# Patient Record
Sex: FEMALE | Race: WHITE | Employment: FULL TIME | URBAN - METROPOLITAN AREA
[De-identification: names, ages, dates, MRNs, and addresses within clinical notes are randomized per-mention and may not be internally consistent; named-entity substitution may affect disease eponyms.]

---

## 2017-01-19 LAB
ANTIBODY SCREEN, EXTERNAL: NORMAL
CHLAMYDIA, EXTERNAL: NORMAL
HBSAG, EXTERNAL: NORMAL
HCT, EXTERNAL: 38.4
HGB, EXTERNAL: 12.7
HIV, EXTERNAL: NORMAL
N. GONORRHEA, EXTERNAL: NORMAL
PLATELET CNT,   EXTERNAL: 325
RPR, EXTERNAL: NORMAL
RUBELLA, EXTERNAL: NORMAL
TYPE, ABO & RH, EXTERNAL: NORMAL
URINALYSIS, EXTERNAL: NORMAL

## 2017-07-24 LAB — GRBS, EXTERNAL: POSITIVE

## 2017-07-26 ENCOUNTER — HOSPITAL ENCOUNTER (EMERGENCY)
Age: 22
Discharge: HOME | End: 2017-07-26
Attending: OBSTETRICS & GYNECOLOGY | Admitting: SPECIALIST
Payer: OTHER GOVERNMENT

## 2017-07-26 VITALS
SYSTOLIC BLOOD PRESSURE: 128 MMHG | HEART RATE: 107 BPM | WEIGHT: 169 LBS | DIASTOLIC BLOOD PRESSURE: 79 MMHG | TEMPERATURE: 98.1 F

## 2017-07-26 PROCEDURE — 99283 EMERGENCY DEPT VISIT LOW MDM: CPT

## 2017-07-26 PROCEDURE — 59025 FETAL NON-STRESS TEST: CPT

## 2017-07-26 PROCEDURE — 96372 THER/PROPH/DIAG INJ SC/IM: CPT

## 2017-07-26 PROCEDURE — 74011250636 HC RX REV CODE- 250/636: Performed by: OBSTETRICS & GYNECOLOGY

## 2017-07-26 RX ORDER — BETAMETHASONE SODIUM PHOSPHATE AND BETAMETHASONE ACETATE 3; 3 MG/ML; MG/ML
12 INJECTION, SUSPENSION INTRA-ARTICULAR; INTRALESIONAL; INTRAMUSCULAR; SOFT TISSUE ONCE
Status: COMPLETED | OUTPATIENT
Start: 2017-07-26 | End: 2017-07-26

## 2017-07-26 RX ADMIN — BETAMETHASONE SODIUM PHOSPHATE AND BETAMETHASONE ACETATE 12 MG: 3; 3 INJECTION, SUSPENSION INTRA-ARTICULAR; INTRALESIONAL; INTRAMUSCULAR at 15:08

## 2017-07-26 NOTE — IP AVS SNAPSHOT
Summary of Care Report The Summary of Care report has been created to help improve care coordination. Users with access to [employer] or [address] (Web-based application) may access additional patient information including the Discharge Summary. If you are not currently a [address] user and need more information, please call the number listed below in the [place] [address] section and ask to be connected with Medical Records. Facility Information Name Address Phone [address] [facility] [address] [place] [id number] [phone] Patient Information Patient Name Sex HERBERT Richardson  ([id number]) Female 1995 Discharge Information Admitting Provider Service Area Unit Ariana Rodgers MD / [address] Labor & Delivery / [phone] Discharge Provider Discharge Date/Time Discharge Disposition Destination (none) 2017 (Pending) AHR (none) Patient Language Language ENGLISH [13] You are allergic to the following No active allergies Current Discharge Medication List  
  
Notice You have not been prescribed any medications. Follow-up Information Follow up With Details Comments Contact Info None   None (395) Patient stated that they have no PCP [facility] In 1 day Follow up for shot, bring with you to office for 3:15pm 2017 [address] [facility] [address] 
[phone] Discharge Instructions None Chart Review Routing History No Routing History on File

## 2017-07-26 NOTE — IP AVS SNAPSHOT
[address] Patient: Candice Alvarez MRN: [id number] :1995 Current Discharge Medication List  
  
Notice You have not been prescribed any medications.

## 2017-07-26 NOTE — PROGRESS NOTES
Maranda Maravilla MD  [address]   [address] [facility]  [address]  [phone]     Labor and delivery screening visit    Name: Merceda Primrose MRN: [id number]  SSN: [id number]    YOB: 1995  Age: 23 y.o. Sex: female        Theron Lugo is a 23 y.o.  female who is due 2017, by Other Basis. This makes her 36w0d. She  is being seen for   Chief Complaint   Patient presents with    Headache   ,   OB History    Para Term  AB Living   1        SAB TAB Ectopic Molar Multiple Live Births              # Outcome Date GA Lbr Xavier/2nd Weight Sex Delivery Anes PTL Lv   1 Current                   She is being seen in screening location 3420/01. [address] [facility]    Historical Additional  Problem List.:    No Known Allergies  No past medical history on file. Past Surgical History:   Procedure Laterality Date    HX OTHER SURGICAL      wisdom teeth removed     No family history on file. Social History     Social History    Marital status:      Spouse name: N/A    Number of children: N/A    Years of education: N/A     Occupational History    Not on file. Social History Main Topics    Smoking status: Never Smoker    Smokeless tobacco: Never Used    Alcohol use No    Drug use: No    Sexual activity: Yes     Partners: Male     Other Topics Concern    Not on file     Social History Narrative    No narrative on file     Prior to Admission medications    Not on File        Objective:  Visit Vitals    /69    Pulse (!) 106    Temp 98.1 °F (36.7 °C)    Wt 76.7 kg (169 lb)           No results found for this or any previous visit (from the past 24 hour(s)). Fetal Heart Rate:   Baseline FHR: No data found.        Fetal heart variability:moderate  Fetal Heart Rate decelerations: none  Fetal Heart Rate accelerations: yes  At least 10 beats elevation and two or more in twenty minutes  Baseline FHR:between 120-160beats per minute  Fetal Non-stress Test: Reactive  Assessment of NST:  Reactive and  No evidence of fetal comprimise         Estimated Date of Delivery: 17  OB History      Para Term  AB Living    1         SAB TAB Ectopic Molar Multiple Live Births                       Physical Exam: Per myself or nursing assessment:  Patient without distress  HEENT: No obvious head trauma, she can hear at a conversational level, vision is adequate, and no choking or difficulty swallowing. Neurologically: She oriented to time and place as well as understands her situation and give a good medical history. Chest: clear to auscultation and percussion  Rflexes 2 +  Abdomen: benign with no obvious gallbladder or appendicits type pain, no CVA tenderness and the fundal size is consistent with her dates within four centimeters. Pelvic: External genitalia normal without inflammation, lesions or abnormal discharge  Extremities: with some swelling but not hyperreflexia  CervicalExam: / / / closed by report             Impression/Plan:     Plan:  Excellent BP here, labs not repeated  Steroid dose given and can repeat in office tomorrow as we are holding out and RBA made clear. Margene Sandifer given to PT and will bring with her tomorrow to office, good NST now. Procedures done: screening visit of moderate complexity.                                      Non-stress test, 00171-25                                  Signed By:  Suzie Valverde MD     2017

## 2017-07-26 NOTE — IP AVS SNAPSHOT
Mk Santoro 
 
 
 [address] [place] [address] [address] Patient: Lennox Gibbons MRN: [id number] :1995 You are allergic to the following No active allergies Recent Documentation Weight OB Status Smoking Status 76.7 kg Pregnant Never Smoker Emergency Contacts Name Discharge Info Relation Home Work Mobile Tex Dowell DISCHARGE CAREGIVER [3] Spouse [3]   [phone] About your hospitalization You were admitted on:  N/A You last received care in the:  [address] [place] [address] You were discharged on:  2017 Unit phone number:  [phone] Why you were hospitalized Your primary diagnosis was:  Not on File Providers Seen During Your Hospitalizations Provider Role Specialty Primary office phone Ad Alcantar MD Attending Provider Obstetrics & Gynecology [phone] Your Primary Care Physician (PCP) Primary Care Physician Office Phone Office Fax NONE ** None ** ** None ** Follow-up Information Follow up With Details Comments Contact Info None   None (395) Patient stated that they have no PCP [facility] In 1 day Follow up for shot, bring with you to office for 3:15pm 2017 [address] [facility] [address] 
[phone] Current Discharge Medication List  
  
Notice You have not been prescribed any medications. Discharge Instructions None Discharge Instructions Attachments/References PREGNANCY: HIGH BLOOD PRESSURE (ENGLISH) PREECLAMPSIA (ENGLISH) Discharge Orders None Introducing [facility]! Raphael Mart introduces [employer] patient portal. Now you can access parts of your medical record, email your doctor's office, and request medication refills online.    
 
1. In your internet browser, go to [email or web address] 2. Click on the First Time User? Click Here link in the Sign In box. You will see the New Member Sign Up page. 3. Enter your [employer] Access Code exactly as it appears below. You will not need to use this code after youve completed the sign-up process. If you do not sign up before the expiration date, you must request a new code. · [employer] Access Code: [email or web address] Expires: 10/24/2017  3:43 PM 
 
4. Enter the last four digits of your Social Security Number (xxxx) and Date of Birth (mm/dd/yyyy) as indicated and click Submit. You will be taken to the next sign-up page. 5. Create a [employer] ID. This will be your [employer] login ID and cannot be changed, so think of one that is secure and easy to remember. 6. Create a [employer] password. You can change your password at any time. 7. Enter your Password Reset Question and Answer. This can be used at a later time if you forget your password. 8. Enter your e-mail address. You will receive e-mail notification when new information is available in [address] 9. Click Sign Up. You can now view and download portions of your medical record. 10. Click the Download Summary menu link to download a portable copy of your medical information. If you have questions, please visit the Frequently Asked Questions section of the [employer] website. Remember, [employer] is NOT to be used for urgent needs. For medical emergencies, dial 911. Now available from your iPhone and Android! General Information Please provide this summary of care documentation to your next provider. Patient Signature:  ____________________________________________________________ Date:  ____________________________________________________________  
  
Gearldine Moulds Provider Signature:  ____________________________________________________________ Date:  ____________________________________________________________ More Information High Blood Pressure in Pregnancy: Care Instructions Your Care Instructions High blood pressure (hypertension) means that the force of blood against your artery walls is too strong. Mild high blood pressure during pregnancy is not usually dangerous. Your doctor will probably just want to watch you closely. But when blood pressure is very high, it can reduce oxygen to your baby. This can affect how well your baby grows. High blood pressure also means that you are at higher risk for: · Preeclampsia. This is a problem that includes high blood pressure and damage to your liver or kidneys. It can also reduce how much oxygen your baby gets. In some cases, it leads to eclampsia. Eclampsia causes seizures. · Placental abruption. This is a problem when the placenta separates from the uterus before birth. It prevents the baby from getting enough oxygen and nutrients. Sometimes it can cause death for the baby and the mother. To prevent problems for you or your baby, you will have to check your blood pressure often. You will do this until after your baby is born. If your blood pressure rises suddenly or is very high during your pregnancy, your doctor may prescribe medicines. They can usually control blood pressure. If your blood pressure affects your or your baby's health, your doctor may need to deliver your baby early. After your baby is born, your blood pressure will probably improve. But sometimes blood pressure problems continue after birth. Follow-up care is a key part of your treatment and safety. Be sure to make and go to all appointments, and call your doctor if you are having problems. It's also a good idea to know your test results and keep a list of the medicines you take. How can you care for yourself at home? · Take and write down your blood pressure at home if your doctor tells you to. · Take your medicines exactly as prescribed.  Call your doctor if you think you are having a problem with your medicine. · Do not smoke. If you need help quitting, talk to your doctor about stop-smoking programs and medicines. These can increase your chances of quitting for good. · Do not gain too much weight during your pregnancy. Talk to your doctor about how much weight gain is healthy. · Eat a healthy diet. · Don't use a lot of salt. Take the salt shaker off the table. · Get regular, mild exercise. Walking or swimming several times a week can be healthy for you and your baby. · Reduce stress, and find time to relax. This is very important if you continue to work or have a busy schedule. It's also important if you have small children at home. When should you call for help? Call 911 anytime you think you may need emergency care. For example, call if: 
· You passed out (lost consciousness). · You have a seizure. Call your doctor now or seek immediate medical care if: 
· You have symptoms of preeclampsia, such as: 
¨ Sudden swelling of your face, hands, or feet. ¨ New vision problems (such as dimness or blurring). ¨ A severe headache. · Your blood pressure is higher than it should be or rises suddenly. · You have new nausea or vomiting. · You think that you are in labor. · You have new belly pain. Watch closely for changes in your health, and be sure to contact your doctor if: 
· You gain weight rapidly. Where can you learn more? Go to http://fred-sommer.info/. Enter [phone] in the search box to learn more about \"High Blood Pressure in Pregnancy: Care Instructions. \" Current as of: March 16, 2017 Content Version: 11.3 © 6123-7713 Healthwise, Incorporated. Care instructions adapted under license by [employer] (which disclaims liability or warranty for this information).  If you have questions about a medical condition or this instruction, always ask your healthcare professional. Charli Aggarwal, [facility] disclaims any warranty or liability for your use of this information. Preeclampsia: Care Instructions Your Care Instructions Preeclampsia occurs when a woman's blood pressure rises during pregnancy. Often with preeclampsia, you also have swelling in your legs, hands, and face. A test may show too much protein in your urine. Preeclampsia is also called toxemia. If preeclampsia is severe and not treated, it can lead to seizures (eclampsia) and damage to your liver or kidneys. Preeclampsia can prevent your baby from getting enough food and oxygen. This can cause a low birth weight or other problems. Your doctor will watch you closely to prevent these problems. He or she also may recommend that you rest in bed most of the day. If your preeclampsia is a danger to your health or the health of your baby, your doctor may need to deliver your baby early. While preeclampsia is a concern, most women with preeclampsia have healthy babies. After a woman gives birth, preeclampsia usually goes away on its own. Follow-up care is a key part of your treatment and safety. Be sure to make and go to all appointments, and call your doctor if you are having problems. It's also a good idea to know your test results and keep a list of the medicines you take. How can you care for yourself at home? · Take and record your blood pressure at home if your doctor tells you to. ¨ Learn the importance of the two measures of blood pressure (such as 120 over 80, or 120/80). The first number is the systolic pressure, which is the force of blood on the artery walls as the heart pumps. The second number is the diastolic pressure, which is the force of blood on the artery walls between heartbeats, when the heart is at rest. You have a choice of monitors to use. ¨ Manual monitor: You pump up the cuff and use a stethoscope to listen for your pulse.  
¨ Electronic monitor: The cuff inflates, and a gauge shows your pulse rate. 
¨ To take your blood pressure: ¨ Ask your doctor to check your blood pressure monitor to be sure that it is accurate and that the cuff fits you. Also ask your doctor to watch you to make sure that you are using it right. ¨ You should not eat, use tobacco products, or use medicine known to raise blood pressure (such as some nasal decongestant sprays) before you take your blood pressure. ¨ Avoid taking your blood pressure if you have just exercised or are nervous or upset. Rest at least 15 minutes before you take your blood pressure. · If your doctor advises, check the protein levels in your urine. Your doctor or nurse will show you how to do this. · Take your medicines exactly as prescribed. Call your doctor if you think you are having a problem with your medicine. · Do not smoke. Quitting smoking will help lower your blood pressure and improve your baby's growth and health. If you need help quitting, talk to your doctor about stop-smoking programs and medicines. These can increase your chances of quitting for good. · Eat a balanced and healthy diet that has lots of fruits and vegetables. · If your doctor advised bed rest, be sure to stay off your feet and rest as much as possible. ¨ Keep a phone, phone book, notepad, and pen near the bed where you can easily reach them. ¨ Gently stretch your legs every hour to maintain good blood flow. ¨ Have another family member pack snacks and lunch food in a cooler close to your bed. ¨ Use this time for activities that you usually cannot find time for, such as reading, craft projects, or letter writing. · You can keep track of your baby's health by noting the length of time it takes to count 10 movements (such as kicks, flutters, or rolls). Feeling 10 movements in less than 1 hour is considered normal. Track your baby's movements once each day, and bring this record with you to each prenatal visit. When should you call for help? Call 911 anytime you think you may need emergency care. For example, call if: 
· You have a seizure. · You passed out (lost consciousness). · You have severe vaginal bleeding. · You have severe pain in your belly or pelvis. · You have had fluid gushing or leaking from your vagina and you know or think the umbilical cord is bulging into your vagina. If this happens, immediately get down on your knees so your rear end (buttocks) is higher than your head. This will decrease the pressure on the cord until help arrives. Call your doctor now or seek immediate medical care if: 
· You have sudden swelling of your face, hands, or feet. · You have new vision problems (such as dimness or blurring). · You have a severe headache. · You have any vaginal bleeding. · You have belly pain or cramping. · You have a fever. · You have had regular contractions (with or without pain) for an hour. This means that you have 8 or more within 1 hour or 4 or more in 20 minutes after you change your position and drink fluids. · You have a sudden release of fluid from the vagina. · You have low back pain or pelvic pressure that does not go away. · You notice that your baby has stopped moving or is moving much less than normal. 
Watch closely for changes in your health, and be sure to contact your doctor if you have any questions. Where can you learn more? Go to http://fred-sommer.info/. Enter A537 in the search box to learn more about \"Preeclampsia: Care Instructions. \" Current as of: March 16, 2017 Content Version: 11.3 © 2319-9189 [employer]. Care instructions adapted under license by [employer] (which disclaims liability or warranty for this information). If you have questions about a medical condition or this instruction, always ask your healthcare professional. [address] any warranty or liability for your use of this information.

## 2017-07-26 NOTE — ROUTINE PROCESS
Pt arrived to unit from [place] office with 3 day history of Headaches and High BP, denies dizziness, blurred vision. Monitors applied and BP obtained. 12 Dr Blanco Shadow in with pt discussing [address] Pt discharged home with instructions and medication to take to Dr office.

## 2017-07-28 ENCOUNTER — HOSPITAL ENCOUNTER (OUTPATIENT)
Age: 22
Setting detail: OBSERVATION
Discharge: HOME | End: 2017-07-29
Attending: SPECIALIST | Admitting: OBSTETRICS & GYNECOLOGY
Payer: OTHER GOVERNMENT

## 2017-07-28 PROBLEM — O12.13 PROTEINURIA AFFECTING PREGNANCY IN THIRD TRIMESTER: Status: ACTIVE | Noted: 2017-07-28

## 2017-07-28 PROBLEM — R51.9 EPISODIC HEADACHE: Status: ACTIVE | Noted: 2017-07-28

## 2017-07-28 LAB
ALBUMIN SERPL BCP-MCNC: 2.7 G/DL (ref 3.4–5)
ALBUMIN/GLOB SERPL: 0.8 {RATIO} (ref 0.8–1.7)
ALP SERPL-CCNC: 105 U/L (ref 45–117)
ALT SERPL-CCNC: 12 U/L (ref 13–56)
ANION GAP BLD CALC-SCNC: 14 MMOL/L (ref 3–18)
AST SERPL W P-5'-P-CCNC: 11 U/L (ref 15–37)
BASOPHILS # BLD AUTO: 0 K/UL (ref 0–0.06)
BASOPHILS # BLD: 0 % (ref 0–2)
BILIRUB SERPL-MCNC: 0.2 MG/DL (ref 0.2–1)
BUN SERPL-MCNC: 14 MG/DL (ref 7–18)
BUN/CREAT SERPL: 22 (ref 12–20)
CALCIUM SERPL-MCNC: 9 MG/DL (ref 8.5–10.1)
CHLORIDE SERPL-SCNC: 108 MMOL/L (ref 100–108)
CO2 SERPL-SCNC: 19 MMOL/L (ref 21–32)
CREAT SERPL-MCNC: 0.65 MG/DL (ref 0.6–1.3)
DIFFERENTIAL METHOD BLD: ABNORMAL
EOSINOPHIL # BLD: 0 K/UL (ref 0–0.4)
EOSINOPHIL NFR BLD: 0 % (ref 0–5)
ERYTHROCYTE [DISTWIDTH] IN BLOOD BY AUTOMATED COUNT: 14 % (ref 11.6–14.5)
GLOBULIN SER CALC-MCNC: 3.3 G/DL (ref 2–4)
GLUCOSE SERPL-MCNC: 86 MG/DL (ref 74–99)
HCT VFR BLD AUTO: 28.8 % (ref 35–45)
HGB BLD-MCNC: 9.3 G/DL (ref 12–16)
LYMPHOCYTES # BLD AUTO: 8 % (ref 21–52)
LYMPHOCYTES # BLD: 1.2 K/UL (ref 0.9–3.6)
MCH RBC QN AUTO: 29.2 PG (ref 24–34)
MCHC RBC AUTO-ENTMCNC: 32.3 G/DL (ref 31–37)
MCV RBC AUTO: 90.6 FL (ref 74–97)
MONOCYTES # BLD: 1.4 K/UL (ref 0.05–1.2)
MONOCYTES NFR BLD AUTO: 10 % (ref 3–10)
NEUTS SEG # BLD: 12.3 K/UL (ref 1.8–8)
NEUTS SEG NFR BLD AUTO: 82 % (ref 40–73)
PLATELET # BLD AUTO: 197 K/UL (ref 135–420)
PMV BLD AUTO: 10 FL (ref 9.2–11.8)
POTASSIUM SERPL-SCNC: 4 MMOL/L (ref 3.5–5.5)
PROT SERPL-MCNC: 6 G/DL (ref 6.4–8.2)
RBC # BLD AUTO: 3.18 M/UL (ref 4.2–5.3)
SODIUM SERPL-SCNC: 141 MMOL/L (ref 136–145)
URATE SERPL-MCNC: 6.1 MG/DL (ref 2.6–7.2)
WBC # BLD AUTO: 14.9 K/UL (ref 4.6–13.2)

## 2017-07-28 PROCEDURE — 84156 ASSAY OF PROTEIN URINE: CPT | Performed by: OBSTETRICS & GYNECOLOGY

## 2017-07-28 PROCEDURE — 80053 COMPREHEN METABOLIC PANEL: CPT | Performed by: OBSTETRICS & GYNECOLOGY

## 2017-07-28 PROCEDURE — 85025 COMPLETE CBC W/AUTO DIFF WBC: CPT | Performed by: OBSTETRICS & GYNECOLOGY

## 2017-07-28 PROCEDURE — 99218 HC RM OBSERVATION: CPT

## 2017-07-28 PROCEDURE — 82570 ASSAY OF URINE CREATININE: CPT | Performed by: SPECIALIST

## 2017-07-28 PROCEDURE — 36415 COLL VENOUS BLD VENIPUNCTURE: CPT | Performed by: OBSTETRICS & GYNECOLOGY

## 2017-07-28 PROCEDURE — 77030020255 HC SOL INJ LR 1000ML BG

## 2017-07-28 PROCEDURE — 83615 LACTATE (LD) (LDH) ENZYME: CPT | Performed by: OBSTETRICS & GYNECOLOGY

## 2017-07-28 PROCEDURE — 74011250637 HC RX REV CODE- 250/637: Performed by: OBSTETRICS & GYNECOLOGY

## 2017-07-28 PROCEDURE — 84550 ASSAY OF BLOOD/URIC ACID: CPT | Performed by: OBSTETRICS & GYNECOLOGY

## 2017-07-28 RX ORDER — ACETAMINOPHEN 500 MG
1000 TABLET ORAL ONCE
Status: COMPLETED | OUTPATIENT
Start: 2017-07-28 | End: 2017-07-28

## 2017-07-28 RX ORDER — SODIUM CHLORIDE 0.9 % (FLUSH) 0.9 %
5-10 SYRINGE (ML) INJECTION EVERY 8 HOURS
Status: DISCONTINUED | OUTPATIENT
Start: 2017-07-28 | End: 2017-07-29 | Stop reason: HOSPADM

## 2017-07-28 RX ORDER — SODIUM CHLORIDE 0.9 % (FLUSH) 0.9 %
5-10 SYRINGE (ML) INJECTION AS NEEDED
Status: DISCONTINUED | OUTPATIENT
Start: 2017-07-28 | End: 2017-07-29 | Stop reason: HOSPADM

## 2017-07-28 RX ADMIN — ACETAMINOPHEN 1000 MG: 500 TABLET ORAL at 21:18

## 2017-07-28 NOTE — IP AVS SNAPSHOT
[address] Patient: Catrachita Del Cid MRN: [id number] :1995 You are allergic to the following No active allergies Recent Documentation OB Status Smoking Status Pregnant Never Smoker Unresulted Labs Order Current Status LD In process Emergency Contacts Name Discharge Info Relation Home Work Mobile Tex Dowell DISCHARGE CAREGIVER [3] Spouse [3]   [phone] About your hospitalization You were admitted on:  2017 You last received care in the:  [address] [place] [address] You were discharged on:  2017 Unit phone number:  [phone] Why you were hospitalized Your primary diagnosis was:  Episodic Headache Your diagnoses also included:  Proteinuria Affecting Pregnancy In Third Trimester Providers Seen During Your Hospitalizations Provider Role Specialty Primary office phone Farhad Ivey MD Attending Provider Obstetrics & Gynecology [phone] Your Primary Care Physician (PCP) Primary Care Physician Office Phone Office Fax NONE ** None ** ** None ** Follow-up Information None Current Discharge Medication List  
  
Notice You have not been prescribed any medications. Discharge Instructions None Discharge Instructions Attachments/References PREGNANCY: PRECAUTIONS (ENGLISH) PREECLAMPSIA (ENGLISH) Discharge Orders None Introducing [facility]! [employer] introduces PPTV patient portal. Now you can access parts of your medical record, email your doctor's office, and request medication refills online. 1. In your internet browser, go to [email or web address] 2. Click on the First Time User? Click Here link in the Sign In box. You will see the New Member Sign Up page. 3. Enter your ClaimSync Access Code exactly as it appears below. You will not need to use this code after youve completed the sign-up process. If you do not sign up before the expiration date, you must request a new code. · ClaimSync Access Code: [email or web address] Expires: 10/24/2017  3:43 PM 
 
4. Enter the last four digits of your Social Security Number (xxxx) and Date of Birth (mm/dd/yyyy) as indicated and click Submit. You will be taken to the next sign-up page. 5. Create a ClaimSync ID. This will be your ClaimSync login ID and cannot be changed, so think of one that is secure and easy to remember. 6. Create a ClaimSync password. You can change your password at any time. 7. Enter your Password Reset Question and Answer. This can be used at a later time if you forget your password. 8. Enter your e-mail address. You will receive e-mail notification when new information is available in [address] 9. Click Sign Up. You can now view and download portions of your medical record. 10. Click the Download Summary menu link to download a portable copy of your medical information. If you have questions, please visit the Frequently Asked Questions section of the ClaimSync website. Remember, ClaimSync is NOT to be used for urgent needs. For medical emergencies, dial 911. Now available from your iPhone and Android! General Information Please provide this summary of care documentation to your next provider. Patient Signature:  ____________________________________________________________ Date:  ____________________________________________________________  
  
Daniel Cart Provider Signature:  ____________________________________________________________ Date:  ____________________________________________________________ More Information Pregnancy Precautions: Care Instructions Your Care Instructions There is no sure way to prevent labor before your due date ( labor) or to prevent most other pregnancy problems. But there are things you can do to increase your chances of a healthy pregnancy. Go to your appointments, follow your doctor's advice, and take good care of yourself. Eat well, and exercise (if your doctor agrees). And make sure to drink plenty of water. Follow-up care is a key part of your treatment and safety. Be sure to make and go to all appointments, and call your doctor if you are having problems. It's also a good idea to know your test results and keep a list of the medicines you take. How can you care for yourself at home? · Make sure you go to your prenatal appointments. At each visit, your doctor will check your blood pressure. Your doctor will also check to see if you have protein in your urine. High blood pressure and protein in urine are signs of preeclampsia. This condition can be dangerous for you and your baby. · Drink plenty of fluids, enough so that your urine is light yellow or clear like water. Dehydration can cause contractions. If you have kidney, heart, or liver disease and have to limit fluids, talk with your doctor before you increase the amount of fluids you drink. · Tell your doctor right away if you notice any symptoms of an infection, such as: ¨ Burning when you urinate. ¨ A foul-smelling discharge from your vagina. ¨ Vaginal itching. ¨ Unexplained fever. ¨ Unusual pain or soreness in your uterus or lower belly. · Eat a balanced diet. Include plenty of foods that are high in calcium and iron. ¨ Foods high in calcium include milk, cheese, yogurt, almonds, and broccoli. ¨ Foods high in iron include red meat, shellfish, poultry, eggs, beans, raisins, whole-grain bread, and leafy green vegetables. · Do not smoke. If you need help quitting, talk to your doctor about stop-smoking programs and medicines. These can increase your chances of quitting for good. · Do not drink alcohol or use illegal drugs. · Follow your doctor's directions about activity. Your doctor will let you know how much, if any, exercise you can do. · Ask your doctor if you can have sex. If you are at risk for early labor, your doctor may ask you to not have sex. · Take care to prevent falls. During pregnancy, your joints are loose, and your balance is off. Sports such as bicycling, skiing, or in-line skating can increase your risk of falling. And don't ride horses or motorcycles, dive, water ski, scuba dive, or parachute jump while you are pregnant. · Avoid getting very hot. Do not use saunas or hot tubs. Avoid staying out in the sun in hot weather for long periods. Take acetaminophen (Tylenol) to lower a high fever. · Do not take any over-the-counter or herbal medicines or supplements without talking to your doctor or pharmacist first. 
When should you call for help? Call 911 anytime you think you may need emergency care. For example, call if: 
· You passed out (lost consciousness). · You have severe vaginal bleeding. · You have severe pain in your belly or pelvis. · You have had fluid gushing or leaking from your vagina and you know or think the umbilical cord is bulging into your vagina. If this happens, immediately get down on your knees so your rear end (buttocks) is higher than your head. This will decrease the pressure on the cord until help arrives. Call your doctor now or seek immediate medical care if: 
· You have signs of preeclampsia, such as: 
¨ Sudden swelling of your face, hands, or feet. ¨ New vision problems (such as dimness or blurring). ¨ A severe headache. · You have any vaginal bleeding. · You have belly pain or cramping. · You have a fever. · You have had regular contractions (with or without pain) for an hour. This means that you have 8 or more within 1 hour or 4 or more in 20 minutes after you change your position and drink fluids. · You have a sudden release of fluid from your vagina. · You have low back pain or pelvic pressure that does not go away. · You notice that your baby has stopped moving or is moving much less than normal. 
Watch closely for changes in your health, and be sure to contact your doctor if you have any problems. Where can you learn more? Go to http://fred-sommer.info/. Enter [id number] in the search box to learn more about \"Pregnancy Precautions: Care Instructions. \" Current as of: March 16, 2017 Content Version: 11.3 © 8414-1824 [employer]. Care instructions adapted under license by [employer] (which disclaims liability or warranty for this information). If you have questions about a medical condition or this instruction, always ask your healthcare professional. [address] any warranty or liability for your use of this information. Preeclampsia: Care Instructions Your Care Instructions Preeclampsia occurs when a woman's blood pressure rises during pregnancy. Often with preeclampsia, you also have swelling in your legs, hands, and face. A test may show too much protein in your urine. Preeclampsia is also called toxemia. If preeclampsia is severe and not treated, it can lead to seizures (eclampsia) and damage to your liver or kidneys. Preeclampsia can prevent your baby from getting enough food and oxygen. This can cause a low birth weight or other problems. Your doctor will watch you closely to prevent these problems. He or she also may recommend that you rest in bed most of the day. If your preeclampsia is a danger to your health or the health of your baby, your doctor may need to deliver your baby early. While preeclampsia is a concern, most women with preeclampsia have healthy babies. After a woman gives birth, preeclampsia usually goes away on its own. Follow-up care is a key part of your treatment and safety.  Be sure to make and go to all appointments, and call your doctor if you are having problems. It's also a good idea to know your test results and keep a list of the medicines you take. How can you care for yourself at home? · Take and record your blood pressure at home if your doctor tells you to. ¨ Learn the importance of the two measures of blood pressure (such as 120 over 80, or 120/80). The first number is the systolic pressure, which is the force of blood on the artery walls as the heart pumps. The second number is the diastolic pressure, which is the force of blood on the artery walls between heartbeats, when the heart is at rest. You have a choice of monitors to use. ¨ Manual monitor: You pump up the cuff and use a stethoscope to listen for your pulse. ¨ Electronic monitor: The cuff inflates, and a gauge shows your pulse rate. ¨ To take your blood pressure: ¨ Ask your doctor to check your blood pressure monitor to be sure that it is accurate and that the cuff fits you. Also ask your doctor to watch you to make sure that you are using it right. ¨ You should not eat, use tobacco products, or use medicine known to raise blood pressure (such as some nasal decongestant sprays) before you take your blood pressure. ¨ Avoid taking your blood pressure if you have just exercised or are nervous or upset. Rest at least 15 minutes before you take your blood pressure. · If your doctor advises, check the protein levels in your urine. Your doctor or nurse will show you how to do this. · Take your medicines exactly as prescribed. Call your doctor if you think you are having a problem with your medicine. · Do not smoke. Quitting smoking will help lower your blood pressure and improve your baby's growth and health. If you need help quitting, talk to your doctor about stop-smoking programs and medicines. These can increase your chances of quitting for good. · Eat a balanced and healthy diet that has lots of fruits and vegetables. · If your doctor advised bed rest, be sure to stay off your feet and rest as much as possible. ¨ Keep a phone, phone book, notepad, and pen near the bed where you can easily reach them. ¨ Gently stretch your legs every hour to maintain good blood flow. ¨ Have another family member pack snacks and lunch food in a cooler close to your bed. ¨ Use this time for activities that you usually cannot find time for, such as reading, craft projects, or letter writing. · You can keep track of your baby's health by noting the length of time it takes to count 10 movements (such as kicks, flutters, or rolls). Feeling 10 movements in less than 1 hour is considered normal. Track your baby's movements once each day, and bring this record with you to each prenatal visit. When should you call for help? Call 911 anytime you think you may need emergency care. For example, call if: 
· You have a seizure. · You passed out (lost consciousness). · You have severe vaginal bleeding. · You have severe pain in your belly or pelvis. · You have had fluid gushing or leaking from your vagina and you know or think the umbilical cord is bulging into your vagina. If this happens, immediately get down on your knees so your rear end (buttocks) is higher than your head. This will decrease the pressure on the cord until help arrives. Call your doctor now or seek immediate medical care if: 
· You have sudden swelling of your face, hands, or feet. · You have new vision problems (such as dimness or blurring). · You have a severe headache. · You have any vaginal bleeding. · You have belly pain or cramping. · You have a fever. · You have had regular contractions (with or without pain) for an hour. This means that you have 8 or more within 1 hour or 4 or more in 20 minutes after you change your position and drink fluids. · You have a sudden release of fluid from the vagina. · You have low back pain or pelvic pressure that does not go away. · You notice that your baby has stopped moving or is moving much less than normal. 
Watch closely for changes in your health, and be sure to contact your doctor if you have any questions. Where can you learn more? Go to http://fred-sommer.info/. Enter L435 in the search box to learn more about \"Preeclampsia: Care Instructions. \" Current as of: March 16, 2017 Content Version: 11.3 © 6338-8064 [employer]. Care instructions adapted under license by [employer] (which disclaims liability or warranty for this information). If you have questions about a medical condition or this instruction, always ask your healthcare professional. [address] any warranty or liability for your use of this information.

## 2017-07-28 NOTE — PROGRESS NOTES
Dr Ry Herman called and notified of pt c/o headache on rt side of head. No visual disturbances but eyes hurt, no epigastric pain. orders for [address] work up

## 2017-07-28 NOTE — PROGRESS NOTES
24 yo cf ambulating in from dr alvarado office for monitoring of elevated pb.  36 2/7 weeks. Dr Allyssa Ruvalcaba is covering    Pt changed into hosp gown spot urine, monitors applied. call bell and phone in reach. Pt states has a h/a rt side of head near temple to back of head 6/10, pt taking fiorcet that works x 1 hr. Pt states her eyes feel strained but no visual disturbancpt has some uc-\"rigid\" at times, denies epigastric pain.  Denies srom or vag bleeding

## 2017-07-28 NOTE — IP AVS SNAPSHOT
Summary of Care Report The Summary of Care report has been created to help improve care coordination. Users with access to [employer] or [address] (Web-based application) may access additional patient information including the Discharge Summary. If you are not currently a [address] user and need more information, please call the number listed below in the [place] [address] section and ask to be connected with Medical Records. Facility Information Name Address Phone [facility] [address] [facility] [address] [id number] [phone] Patient Information Patient Name Sex  Kwaku Escamilla ([id number]) Female 1995 Discharge Information Admitting Provider Service Area Unit Barbarann Cheadle, MD / Mihaela John 92 3 Labor & Delivery / [phone] Discharge Provider Discharge Date/Time Discharge Disposition Destination (none) (none) (none) (none) Patient Language Language ENGLISH [13] Hospital Problems as of 2017  Reviewed: 2017  9:12 PM by Barbarann Cheadle, MD  
  
  
  
 Class Noted - Resolved Last Modified POA Active Problems * (Principal)Episodic headache  2017 - Present 2017 by Barbarann Cheadle, MD Unknown Entered by Barbarann Cheadle, MD  
  Proteinuria affecting pregnancy in third trimester  2017 - Present 2017 by Barbarann Cheadle, MD Unknown Entered by Barbarann Cheadle, MD  
  
Non-Hospital Problems as of 2017  Reviewed: 2017  9:12 PM by Barbarann Cheadle, MD  
 None You are allergic to the following No active allergies Current Discharge Medication List  
  
Notice You have not been prescribed any medications. Follow-up Information None Discharge Instructions None Chart Review Routing History No Routing History on File

## 2017-07-29 VITALS
OXYGEN SATURATION: 98 % | TEMPERATURE: 98.5 F | HEART RATE: 83 BPM | SYSTOLIC BLOOD PRESSURE: 110 MMHG | DIASTOLIC BLOOD PRESSURE: 61 MMHG

## 2017-07-29 PROBLEM — R51.9 EPISODIC HEADACHE: Status: RESOLVED | Noted: 2017-07-28 | Resolved: 2017-07-29

## 2017-07-29 LAB — LDH SERPL L TO P-CCNC: 146 U/L (ref 81–234)

## 2017-07-29 PROCEDURE — 99218 HC RM OBSERVATION: CPT

## 2017-07-29 PROCEDURE — 96365 THER/PROPH/DIAG IV INF INIT: CPT

## 2017-07-29 PROCEDURE — 59025 FETAL NON-STRESS TEST: CPT

## 2017-07-29 PROCEDURE — 96360 HYDRATION IV INFUSION INIT: CPT

## 2017-07-29 PROCEDURE — 99284 EMERGENCY DEPT VISIT MOD MDM: CPT

## 2017-07-29 NOTE — DISCHARGE INSTRUCTIONS
Continue collecting urine until 11:00 tonight. Keep on ice. Return urine container to hospital Sunday morning.

## 2017-07-29 NOTE — PROGRESS NOTES
Antepartum progress note    Patient seen, fetal heart rate and contraction pattern evaluated, patient examined. HA improved with use of Tylenol, no visual changes, no SOB, no CP, no RUQ pain, + FM    Patient Vitals for the past 8 hrs:   Temp Pulse BP   07/29/17 0900 - 83 110/61   07/29/17 0801 - 84 98/49   07/29/17 0728 98.5 °F (36.9 °C) 90 131/80   07/29/17 0700 - 95 104/55   07/29/17 0600 - 88 116/60   07/29/17 0500 98.3 °F (36.8 °C) 94 134/70   07/29/17 0400 - 91 110/55   07/29/17 0300 - 95 101/49         Recent Results (from the past 24 hour(s))   PROTEIN/CREATININE RATIO, URINE    Collection Time: 07/28/17  5:30 PM   Result Value Ref Range    Protein, urine random 24 (H) <11.9 mg/dL    Creatinine, urine 62.00 30 - 125 mg/dL    Protein/Creat. urine Ratio 0.4     URIC ACID    Collection Time: 07/28/17  6:00 PM   Result Value Ref Range    Uric acid 6.1 2.6 - 7.2 MG/DL   METABOLIC PANEL, COMPREHENSIVE    Collection Time: 07/28/17  6:00 PM   Result Value Ref Range    Sodium 141 136 - 145 mmol/L    Potassium 4.0 3.5 - 5.5 mmol/L    Chloride 108 100 - 108 mmol/L    CO2 19 (L) 21 - 32 mmol/L    Anion gap 14 3.0 - 18 mmol/L    Glucose 86 74 - 99 mg/dL    BUN 14 7.0 - 18 MG/DL    Creatinine 0.65 0.6 - 1.3 MG/DL    BUN/Creatinine ratio 22 (H) 12 - 20      GFR est AA >60 >60 ml/min/1.73m2    GFR est non-AA >60 >60 ml/min/1.73m2    Calcium 9.0 8.5 - 10.1 MG/DL    Bilirubin, total 0.2 0.2 - 1.0 MG/DL    ALT (SGPT) 12 (L) 13 - 56 U/L    AST (SGOT) 11 (L) 15 - 37 U/L    Alk.  phosphatase 105 45 - 117 U/L    Protein, total 6.0 (L) 6.4 - 8.2 g/dL    Albumin 2.7 (L) 3.4 - 5.0 g/dL    Globulin 3.3 2.0 - 4.0 g/dL    A-G Ratio 0.8 0.8 - 1.7     CBC WITH AUTOMATED DIFF    Collection Time: 07/28/17  6:00 PM   Result Value Ref Range    WBC 14.9 (H) 4.6 - 13.2 K/uL    RBC 3.18 (L) 4.20 - 5.30 M/uL    HGB 9.3 (L) 12.0 - 16.0 g/dL    HCT 28.8 (L) 35.0 - 45.0 %    MCV 90.6 74.0 - 97.0 FL    MCH 29.2 24.0 - 34.0 PG    MCHC 32.3 31.0 - 37.0 g/dL    RDW 14.0 11.6 - 14.5 %    PLATELET 835 427 - 662 K/uL    MPV 10.0 9.2 - 11.8 FL    NEUTROPHILS 82 (H) 40 - 73 %    LYMPHOCYTES 8 (L) 21 - 52 %    MONOCYTES 10 3 - 10 %    EOSINOPHILS 0 0 - 5 %    BASOPHILS 0 0 - 2 %    ABS. NEUTROPHILS 12.3 (H) 1.8 - 8.0 K/UL    ABS. LYMPHOCYTES 1.2 0.9 - 3.6 K/UL    ABS. MONOCYTES 1.4 (H) 0.05 - 1.2 K/UL    ABS. EOSINOPHILS 0.0 0.0 - 0.4 K/UL    ABS. BASOPHILS 0.0 0.0 - 0.06 K/UL    DF AUTOMATED         Physical Exam:  Alert, oriented x3  CTA unruly  H: RRR  Abdomen: gravidic, soft , non tender   Ext : no e/c/c DTR's +2/IV  Tracing : category 1, no contractions  Fetal Heart Rate: 140\"s reactive    Assessment/Plan:  Patient Active Problem List   Diagnosis Code    Episodic headache R51    Proteinuria affecting pregnancy in third trimester O12.13   Reassuring fetal status. Normal BP's  Will d/c home, she will bring urine for testing tomorrow morning.  She has appointment Monday morning and will forward results to Dr France Saenz MD

## 2017-07-29 NOTE — DISCHARGE SUMMARY
Gynecology Discharge Note          Name: Placido Enamorado   Medical Record Number: [id number]   YOB: 1995  Today's Date: 2017  . Admit date: 2017    Discharge Date: 2017    Attending Physician: Leah Granda MD    Admission Diagnoses: Proteinuria affecting pregnancy in third trimester    Discharge Diagnoses:     Problem List as of 2017  Date Reviewed: 2017          Codes Class Noted - Resolved    Proteinuria affecting pregnancy in third trimester ICD-10-CM: O12.13  ICD-9-CM: 646.23, 791.0  2017 - Present        * (Principal)RESOLVED: Episodic headache ICD-10-CM: R51  ICD-9-CM: 784.0  2017 - 2017                Vitals:    Patient Vitals for the past 8 hrs:   BP Temp Pulse   17 0900 110/61 - 83   17 0801 98/49 - 84   17 0728 131/80 98.5 °F (36.9 °C) 90   17 0700 104/55 - 95   17 0600 116/60 - 88   17 0500 134/70 98.3 °F (36.8 °C) 94   17 0400 110/55 - 91   17 0300 101/49 - 95     Temp (24hrs), Av.3 °F (36.8 °C), Min:98 °F (36.7 °C), Max:98.5 °F (36.9 °C)        Lab/Data Review:      WBC   Date/Time Value Ref Range Status   2017 06:00 PM 14.9 (H) 4.6 - 13.2 K/uL Final   2017 01:00 PM 9.4 4.0 - 11.0 1000/mm3 Final     HGB   Date/Time Value Ref Range Status   2017 06:00 PM 9.3 (L) 12.0 - 16.0 g/dL Final   2017 01:00 PM 12.7 (L) 13.0 - 17.2 gm/dl Final     PLATELET   Date/Time Value Ref Range Status   2017 06:00  135 - 420 K/uL Final               Assessment and Plan:  HA improved, Normal BP, mild proteinuria    Patient Instructions:Continue routine post-op care. With discharge home.  Follow-up Appointments   Procedures    FOLLOW UP VISIT Appointment in: 3 - 5 Days     Standing Status:   Standing     Number of Occurrences:   1     Order Specific Question:   Appointment in     Answer:   3 - 5 Days        Signed By:  Fracisco Blanchard MD     2017

## 2017-07-29 NOTE — ROUTINE PROCESS
Bedside shift change report given to mimi benson rn (oncoming nurse) by alba olmstead rn (offgoing nurse). Report included the following information SBAR, Kardex and MAR.

## 2017-07-29 NOTE — ROUTINE PROCESS
1915 Assumed care of patient, received report from [facility] Олег Kirkpatrick RN.  2040 Spoke with Dr. Otilio Choe MD notified as follows; urine results pending, decel noted, patient was sitting up in bed at time of decel, FHR returned to baseline with accel, patient continues to c/o headache, order given for Tylenol 1000mg PO.

## 2017-07-29 NOTE — H&P
Obstetric Admission History and Physical    Name: Wendy Hernández MRN: [id number] SSN: [id number]    YOB: 1995  Age: 23 y.o. Sex: female       Subjective:      Chief complaint:    Chief Complaint   Patient presents with    Hypertension       Verito Morfin is a 23 y.o.  female, G 1 P 0 who presents at 36 weeks 2d gestation with HA. On admission EFM strip is obtained and is Category 1. Sent from Dr Shell Boo to r/out Pre eclampsia. PAST OB HISTORY  OB History      Para Term  AB Living    1         SAB TAB Ectopic Molar Multiple Live Births                   PAST MEDICAL HISTORY  No past medical history on file. PAST SURGICAL HISTORY  Past Surgical History:   Procedure Laterality Date    HX OTHER SURGICAL      wisdom teeth removed       SOCIAL HISTORY  Social History     Social History    Marital status:      Spouse name: N/A    Number of children: N/A    Years of education: N/A     Occupational History    Not on file. Social History Main Topics    Smoking status: Never Smoker    Smokeless tobacco: Never Used    Alcohol use No    Drug use: No    Sexual activity: Yes     Partners: Male     Other Topics Concern    Not on file     Social History Narrative    No narrative on file       FAMILY HISTORY  No family history on file. ALLERGY:  No Known Allergies    HOME MEDICATIONS:  Prior to Admission medications    Not on File        Review of Systems:  A comprehensive review of systems was negative except for: R sided HA    Objective:     VITAL SIGNS:  Visit Vitals    /60    Pulse 89    Temp 98 °F (36.7 °C)    SpO2 94%     BP wnl   Spot urine 387  One episode of spontaneous decel with recovery     Assessment:     1) 36 weeks Intrauterine Pregnancy .   2) observation  3) Tylenol prn      Plan:     Reassuring fetal status   Observe overnight  Start 24h urine     Signed By:  Albert Lamas MD     2017                      Signed By:  Gilford Crews MD Dhruv     July 28, 2017

## 2017-07-29 NOTE — ROUTINE PROCESS
0715 Bedside and Verbal shift change report given to Aliza Morrow RN (oncoming nurse) by Le Horta RN (offgoing nurse). Report included the following information SBAR, Kardex, Intake/Output, MAR and Recent Results.

## 2017-07-30 LAB
COLLECT DURATION TIME UR: 24 HR
COLLECT DURATION TIME UR: 24 HR
CREAT 24H UR-MRATE: 1451 MG/24HR
PROT 24H UR-MRATE: 352 MG/24HR
SPECIMEN VOL ?TM UR: 1600 ML
SPECIMEN VOL ?TM UR: 1600 ML

## 2017-08-09 ENCOUNTER — HOSPITAL ENCOUNTER (INPATIENT)
Age: 22
LOS: 3 days | Discharge: HOME | End: 2017-08-12
Attending: SPECIALIST | Admitting: SPECIALIST
Payer: OTHER GOVERNMENT

## 2017-08-09 PROBLEM — O14.00 MILD PREECLAMPSIA: Status: ACTIVE | Noted: 2017-08-09

## 2017-08-09 LAB
BASOPHILS # BLD AUTO: 0 K/UL (ref 0–0.06)
BASOPHILS # BLD: 0 % (ref 0–2)
DIFFERENTIAL METHOD BLD: ABNORMAL
EOSINOPHIL # BLD: 0.1 K/UL (ref 0–0.4)
EOSINOPHIL NFR BLD: 1 % (ref 0–5)
ERYTHROCYTE [DISTWIDTH] IN BLOOD BY AUTOMATED COUNT: 14.7 % (ref 11.6–14.5)
HCT VFR BLD AUTO: 34.6 % (ref 35–45)
HGB BLD-MCNC: 10.9 G/DL (ref 12–16)
LYMPHOCYTES # BLD AUTO: 16 % (ref 21–52)
LYMPHOCYTES # BLD: 1.7 K/UL (ref 0.9–3.6)
MCH RBC QN AUTO: 28.4 PG (ref 24–34)
MCHC RBC AUTO-ENTMCNC: 31.5 G/DL (ref 31–37)
MCV RBC AUTO: 90.1 FL (ref 74–97)
MONOCYTES # BLD: 1 K/UL (ref 0.05–1.2)
MONOCYTES NFR BLD AUTO: 10 % (ref 3–10)
NEUTS SEG # BLD: 7.6 K/UL (ref 1.8–8)
NEUTS SEG NFR BLD AUTO: 73 % (ref 40–73)
PLATELET # BLD AUTO: 182 K/UL (ref 135–420)
PMV BLD AUTO: 10.1 FL (ref 9.2–11.8)
RBC # BLD AUTO: 3.84 M/UL (ref 4.2–5.3)
WBC # BLD AUTO: 10.4 K/UL (ref 4.6–13.2)

## 2017-08-09 PROCEDURE — 4A0H7CZ MEASUREMENT OF PRODUCTS OF CONCEPTION, CARDIAC RATE, VIA NATURAL OR ARTIFICIAL OPENING: ICD-10-PCS | Performed by: OBSTETRICS & GYNECOLOGY

## 2017-08-09 PROCEDURE — 74011250636 HC RX REV CODE- 250/636: Performed by: SPECIALIST

## 2017-08-09 PROCEDURE — 74011000258 HC RX REV CODE- 258: Performed by: SPECIALIST

## 2017-08-09 PROCEDURE — 86900 BLOOD TYPING SEROLOGIC ABO: CPT | Performed by: SPECIALIST

## 2017-08-09 PROCEDURE — 85025 COMPLETE CBC W/AUTO DIFF WBC: CPT | Performed by: SPECIALIST

## 2017-08-09 PROCEDURE — 77030020255 HC SOL INJ LR 1000ML BG

## 2017-08-09 PROCEDURE — 86920 COMPATIBILITY TEST SPIN: CPT | Performed by: SPECIALIST

## 2017-08-09 PROCEDURE — 65270000029 HC RM PRIVATE

## 2017-08-09 PROCEDURE — 3E033VJ INTRODUCTION OF OTHER HORMONE INTO PERIPHERAL VEIN, PERCUTANEOUS APPROACH: ICD-10-PCS | Performed by: OBSTETRICS & GYNECOLOGY

## 2017-08-09 PROCEDURE — 10907ZC DRAINAGE OF AMNIOTIC FLUID, THERAPEUTIC FROM PRODUCTS OF CONCEPTION, VIA NATURAL OR ARTIFICIAL OPENING: ICD-10-PCS | Performed by: OBSTETRICS & GYNECOLOGY

## 2017-08-09 RX ORDER — MINERAL OIL
30 OIL (ML) ORAL AS NEEDED
Status: DISCONTINUED | OUTPATIENT
Start: 2017-08-09 | End: 2017-08-09 | Stop reason: HOSPADM

## 2017-08-09 RX ORDER — MISOPROSTOL 200 UG/1
800 TABLET ORAL
Status: DISCONTINUED | OUTPATIENT
Start: 2017-08-09 | End: 2017-08-10 | Stop reason: SDUPTHER

## 2017-08-09 RX ORDER — NALBUPHINE HYDROCHLORIDE 10 MG/ML
10 INJECTION, SOLUTION INTRAMUSCULAR; INTRAVENOUS; SUBCUTANEOUS
Status: DISCONTINUED | OUTPATIENT
Start: 2017-08-09 | End: 2017-08-09 | Stop reason: HOSPADM

## 2017-08-09 RX ORDER — OXYTOCIN/RINGER'S LACTATE 20/1000 ML
500 PLASTIC BAG, INJECTION (ML) INTRAVENOUS ONCE
Status: DISCONTINUED | OUTPATIENT
Start: 2017-08-09 | End: 2017-08-09 | Stop reason: HOSPADM

## 2017-08-09 RX ORDER — LIDOCAINE HYDROCHLORIDE 10 MG/ML
30 INJECTION, SOLUTION EPIDURAL; INFILTRATION; INTRACAUDAL; PERINEURAL AS NEEDED
Status: DISCONTINUED | OUTPATIENT
Start: 2017-08-09 | End: 2017-08-09 | Stop reason: HOSPADM

## 2017-08-09 RX ORDER — LANOLIN ALCOHOL/MO/W.PET/CERES
325 CREAM (GRAM) TOPICAL DAILY
COMMUNITY

## 2017-08-09 RX ORDER — HYDROMORPHONE HYDROCHLORIDE 1 MG/ML
1 INJECTION, SOLUTION INTRAMUSCULAR; INTRAVENOUS; SUBCUTANEOUS
Status: DISCONTINUED | OUTPATIENT
Start: 2017-08-09 | End: 2017-08-09 | Stop reason: HOSPADM

## 2017-08-09 RX ORDER — TERBUTALINE SULFATE 1 MG/ML
0.25 INJECTION SUBCUTANEOUS
Status: DISCONTINUED | OUTPATIENT
Start: 2017-08-09 | End: 2017-08-09 | Stop reason: HOSPADM

## 2017-08-09 RX ORDER — MAG HYDROX/ALUMINUM HYD/SIMETH 200-200-20
15 SUSPENSION, ORAL (FINAL DOSE FORM) ORAL
Status: DISCONTINUED | OUTPATIENT
Start: 2017-08-09 | End: 2017-08-09 | Stop reason: HOSPADM

## 2017-08-09 RX ORDER — OXYTOCIN IN 5 % DEXTROSE 30/500 ML
1-25 PLASTIC BAG, INJECTION (ML) INTRAVENOUS
Status: DISCONTINUED | OUTPATIENT
Start: 2017-08-09 | End: 2017-08-12 | Stop reason: HOSPADM

## 2017-08-09 RX ORDER — SODIUM CHLORIDE, SODIUM LACTATE, POTASSIUM CHLORIDE, CALCIUM CHLORIDE 600; 310; 30; 20 MG/100ML; MG/100ML; MG/100ML; MG/100ML
125 INJECTION, SOLUTION INTRAVENOUS CONTINUOUS
Status: DISCONTINUED | OUTPATIENT
Start: 2017-08-09 | End: 2017-08-09 | Stop reason: HOSPADM

## 2017-08-09 RX ORDER — METHYLERGONOVINE MALEATE 0.2 MG/ML
0.2 INJECTION INTRAVENOUS AS NEEDED
Status: DISCONTINUED | OUTPATIENT
Start: 2017-08-09 | End: 2017-08-10 | Stop reason: SDUPTHER

## 2017-08-09 RX ORDER — OXYTOCIN/RINGER'S LACTATE 20/1000 ML
125 PLASTIC BAG, INJECTION (ML) INTRAVENOUS CONTINUOUS
Status: DISCONTINUED | OUTPATIENT
Start: 2017-08-09 | End: 2017-08-09 | Stop reason: HOSPADM

## 2017-08-09 RX ORDER — ACETAMINOPHEN 325 MG/1
650 TABLET ORAL
Status: DISCONTINUED | OUTPATIENT
Start: 2017-08-09 | End: 2017-08-09 | Stop reason: HOSPADM

## 2017-08-09 RX ORDER — OXYTOCIN 10 [USP'U]/ML
10 INJECTION, SOLUTION INTRAMUSCULAR; INTRAVENOUS
Status: DISCONTINUED | OUTPATIENT
Start: 2017-08-09 | End: 2017-08-09 | Stop reason: HOSPADM

## 2017-08-09 RX ORDER — ONDANSETRON 2 MG/ML
4 INJECTION INTRAMUSCULAR; INTRAVENOUS
Status: DISCONTINUED | OUTPATIENT
Start: 2017-08-09 | End: 2017-08-09 | Stop reason: HOSPADM

## 2017-08-09 RX ADMIN — SODIUM CHLORIDE, SODIUM LACTATE, POTASSIUM CHLORIDE, AND CALCIUM CHLORIDE 125 ML/HR: 600; 310; 30; 20 INJECTION, SOLUTION INTRAVENOUS at 14:30

## 2017-08-09 RX ADMIN — Medication 2 MILLI-UNITS/MIN: at 15:32

## 2017-08-09 RX ADMIN — PENICILLIN G POTASSIUM 2.5 MILLION UNITS: 20000000 POWDER, FOR SOLUTION INTRAVENOUS at 19:58

## 2017-08-09 RX ADMIN — SODIUM CHLORIDE 5 MILLION UNITS: 900 INJECTION INTRAVENOUS at 15:17

## 2017-08-09 NOTE — PROGRESS NOTES
~~ 1330 REC  @N 38 0/7 WKS HERE FOR IND FOR PIH & PROTEIN IN URINE-- CURRENTLY PT DENIES HA, EPIGASTRIC PAIN, VISION CHANGES OR SUDDEN/SEVERE SWELLING. PT DENIES CTX, SROM OR VAG BLEEDING. +FM PER PT. PT INTO GOWN & BED-- EFM APPLIED, ABD PALP SOFT. FHT 140S. PT TO L TILT, SR UP X2, CB IN REACH, FOB IN- REFLEXES WNL BILATERALLY, 1 BEAT OF CLONUS BILATERALLY. POC REVIEWED    ~~ 1341 CONSENTS REVIEWED & LEFT FOR PT TO SIGN    ~~ 1415 ALL CONSENTS SIGNED W/O ? S    ~~ 1430 IV STARTED & ADMIT BLOOD WORK DRAWN- PT VERY ANXIOUS ABOUT IV START SAYING \"IT TOOK THEM 3 TRIES LAST TIME. \"    DEEP BREATHING & RELAXATION TECHNIQUES REVIEWED & ENCOURAGED. PT WELL CONTROLLED. IV STARTED IN 1 ATTEMPT.    ~~1445 DR Moiz Romano PAGEPARTHA, MADE AWARE PT HERE-- MD TO PUT IN ADMIT ORDERS & +GBS ORDERS. DATA BASES COMPLETE-- HX INCLUDES NKDA, +GBS, INCREASED WT GAIN-     ~~ 1517 PCN 5 MILLION UNITS UP IVPB FOR +GBS-- REVIEWED S/E- PT TOLD TO CALL W/ ANY DOSCOMF    ~~1532 2 VISITORS ARE HERE- PITOCIN STARTED @ 2MU VIA PUMP AFTER REVIEW OF MED, INCREASE SCHEDULE & EXPECTED RESULT- PT VOICES UNDERSTANDING    ~~1540 DR Moiz Romano IN [place] TO SEE PT & REVIEW POC    ~~1615 PIT INCREASED- PT STARTING TO FEEL SOME CTX- FOB & VISITORS OUT TO GET CLEAR LIQUIDS OK'd BY MD    ~~1640 FOB & VISITORS RETURN    ~~1700 PT GETTING MORE UNCOMF & STARTING TO BREATH W/ CTX-- SUGGESTED VISITORS (ON PHONES & NOT INVITED TO BE HERE BY PT)  EXIT--  THEY GO PLEASANTLY & PT VOCES APPRECIATION-- REVIEWED BREATHING & RELAXATION TECHNIQUES W/ PT--  PT HAD NO CBE. PT UP TO BR TO VOID QS & STRETCH [place] IN THE ROOM. FOB ASSISTING. PIT INCREASED--    ~~1705 PT BACK TO BED, SITTING.  FHT TRACING WELL-     ~~ 1732 TOCO REPOS/ADJ TO BETTER TRACE CTX-- PT MADE AWARE SHE NOW HAS 5 VISITORS IN THE WAITING ROOM-- PT RELUCTANTLY OKs THEM TO VISIT-- REVIEWED 4 MAX @ ANY 1 TIME-- PT UNDERSTANDS    ~~ 1738 4 VISITORS IN    ~~1805 PT BREATHING W/ CTX, COPING WELL    ~~1839 DR Moiz Romano IN TO SEE & ASSESS PT-  VISITORS OUT, FOB REMAINS- VE DONE--  EXAM DIFF SO LEGS BROUGHT BACK--  MD UNABLE TO AROM DUE TO UNENGAGED PRES PART. PT GUERDA WELL-- MD DISCUSSING POC OPTIONS INCLUDING SERIAL IND OVER DAYS-- PT & FOB RECEPTIVE TO INFO    ~~1850 TIME SPENT SITTING @ BS TO TALK TO PT & FOB & BE SURE THEY BOTH UNDERSTAND ALL THAT HAS BEEN REVIEWED-- ALL ? S ANSWERED    ~~1905 REPORT TO RELIEF

## 2017-08-09 NOTE — PROGRESS NOTES
LABOR PROGRESS NOTE   2017  7:55 PM   Patient's Name:  Kalina Parkinson MRN: [id number]. : 1995    ASSESSMENT   Elier's Estimated Gestational Age: 38w0d weeks. Induced for mild pre-eclampsia. Hypertension with proteinuria. Exam at this time reveals cephalic presentation too high to safely AROM. Discussed with patient and  possibility of multiday induction. Fortunately no stigmata of severe PIH and current BP levels averaging lower than in hospital.  Continue Pitocin at this time in hopes of fostering descent to a point AROM is feasible. .  CERVICAL EXAM: (data input under \"more activities\" and \"flowsheets\" at the top.)  Cervical Exam  Dilation (cm): 2  Eff: 50 %  Station: Floating  Position: Posterior  Cervical Consistency: Soft  Vaginal exam done by? : dr Corey Joshi Status: Intact    FETAL MONITORING:    Baseline FHR: Patient Vitals for the past 2 hrs: Mode Fetal Heart Rate Variability Decelerations Accelerations   17 1900 External 130 6-25 BPM - Yes   17 1840 External 140 6-25 BPM None Yes   17 1820 External 145 6-25 BPM Variable Yes   17 1800 External 130 6-25 BPM None Yes       Elier's  level of comfort is: good. Elier's progress was discussed with her. PLAN    Continue current course with watchful waiting in anticipation of progress.   Continue plan for vaginal delivery  OBJECTIVE DATA / PHYSICAL EXAM     Visit Vitals    /83    Pulse 91    Temp 98.5 °F (36.9 °C)    Resp 18    Ht 5' 1\" (1.549 m)    Wt 78 kg (172 lb)    BMI 32.5 kg/m2       WBC   Date/Time Value Ref Range Status   2017 02:30 PM 10.4 4.6 - 13.2 K/uL Final   2017 06:00 PM 14.9 (H) 4.6 - 13.2 K/uL Final   2017 01:00 PM 9.4 4.0 - 11.0 1000/mm3 Final     HGB   Date/Time Value Ref Range Status   2017 02:30 PM 10.9 (L) 12.0 - 16.0 g/dL Final   2017 06:00 PM 9.3 (L) 12.0 - 16.0 g/dL Final   2017 01:00 PM 12.7 (L) 13.0 - 17.2 gm/dl Final     PLATELET   Date/Time Value Ref Range Status   08/09/2017 02:30  135 - 420 K/uL Final     Hgb, External   Date/Time Value Ref Range Status   01/19/2017 12.7  Final     Platelet cnt., External   Date/Time Value Ref Range Status   01/19/2017 325  Final     AUTHOR:  Gavin Monae MD  August 9, 2017 7:55 PM

## 2017-08-09 NOTE — H&P
HOSPITAL ENCOUNTER NOTE    Patient's Name:  Jessica Reddy. MRN: [id number]. : 1995. Date of Service:  2017  3:14 PM  Physician:  Praveen Saul MD    Management Preeclampsia in Pregnancy    IDENTIFYING DATA  Jessica Reddy is a  23 y.o.   pregnant patient who has developed mild hypertension associated with proteinuria. No persistent Headaches or visual symptoms. Estimated Date of Delivery: 17  Her Estimated Gestational Age is 38w0d weeks. IMPRESSION: Dx Preeclampsia New HTN, Prot or end organ dysfn. [x] Blood pressure over 140/90 x2 4hrs apart after 20 wks. (new onset)  [] Proteinuria > 300 mg /24hr urine or   [x] Urine Protein/Creatinine ratio > 0.3 or urine dip >=  1+ with or without Sxs  [] New onset HTN without proteinuria Dx preeclampsia if:      Ptl < 100,000. Serum Cr > 1.1,  SGOT/PT 2x nl. Pulm edema, Cerebral visual Sx. Severe preeclampsia (Rx proven to reduce stroke)    []. .. Blood pressure greater than 160/110 on 2 occasions 4 hours apart    []. .. Oliguria less than 500 mL per 24 hours    []. .. Progressive renal insufficiency    []. .. Unremitting headache or visual disturbances    []. .. Pulmonary edema    []. .. Epigastric or right upper quadrant pain    []. .. Liver functions 2 times normal    []. ... Platelets less than [phone],[phone]      RECOMMENDATIONS:  For severe hypertension, Headaches or Seizures. Labetalol  (20, 40, 80, 80, 80 mg IV over 2 minutes, escalating doses, repeat every 10 minutes) Normodyne / trandate or 200 mg orally if no IV access. Avoid in asthma, heart failure. Hydralazine 5-10 mg IV over 2 min, repeat every 20 min. Until target blood pressure. achieved. Magnesium sulfate 4-6 g in 100 mL IV  over  20 minutes. Followed by IV infusion of 1-2 g per hour - continue for 24 hours postpartum seizure treatment or prophylaxis only. Contraindications: Pulmonary edema, renal failure, myasthenia gravis. If no IV access:   . .... Rupali Martin Labetalol 200 mg orally or nifedipine 10 mg orally. Repeat in 30 min. If persistent severe hypertension and IV access still unavailable  . ...... MgSO4 10 grams 50% solution IM, 5 grams in each buttox. Vital Signs   . Michaelyn Daily ..q 10 min. Until controlled plus one-hour. .... q 15 min. Next hour   . Michaelyn Daily ..q 30 min Following hour and then   . Michaelyn Daily ..q hour 4 hours  Labs: CBC platelets, LDH, LFTs, Lytes, BUN, creatinine, urine protein. Magnesium sulfate contraindicated  . .. Michaelyn Daily Michaelyn Daily Lorazepam (Ativan)  2-4 mg IV one dose, may repeat once after 10-15 min. .... Michaelyn Daily Diazepam 5-10 mg IV every 5-10 min. To a maximum dose of 30 mg  Magnesium toxicity  . Michaelyn Daily ... Stop magnesium maintenance  . Michaelyn Daily ... Calcium gluconate 1 g IV over 1 to 2 min. (10 mL of 10% solution)       Review of Systems (consider brain imaging studies)  None of the symptoms below are present:  Unremitting headaches  Visual changes  Abnormal neurologic examination  Lethargy  Confusion  Seizures  Focal signs and symptoms  Uncontrolled high blood pressure  Epigastric pain  Vaginal bleeding  Decreased Fetal activity    REVIEW OF SYMPTOMS:   Constitutional: fever, chills denied. Head, Ears, Nose Throat:   Ear pain denied. Sore throat denied. Eyes: Pain denied. Visual disturbance denied. Respiratory: Cough denied. Shortness of breath denied. Cardiovascular: Chest pains denied. Gastrointestinal:   Nausea denied. Vomiting denied. Diarrhea denied. Constipation denied. Genitourinary:   Vaginal Bleeding denied. Vaginal Odor denied. Painful Urination denied. Blood in Urine denied. Pain over kidneys denied. Musculoskeletal: Back pain denied, Joint pain denied. Skin:  Rash denied. Injuries denied. Neurological:    Headaches denied. Dizziness denied. Seizures denied. Psychiatric/Behavioral:   Major mood problems denied.       Objective:     Physical Exam:   General appearance:   Alert, oriented to person, place, and time, cooperative, no distress, appears stated age and is well-developed and well-nourished. Head, Nose, Throat: Normocephalic, atraumatic. Eyes: Conjunctivae appear normal.  Pupils are equal and round. Neck: Normal range of motion. Supple. Heart:  Regular rate and Rhythm. Lungs: Effort normal, No apparent respiratory distress, Wheezes or Cough. Abdomen: Abnormal tenderness not detected. Scars None detected. Size appropriate for 38 weeks pregnancy. External genitalia: normal appearance without obvious lesions. [place]tholin's,  [place]'s, Urethra: Normal.   Vaginal Vault:  Discharge or Odor not detected. Blood not detected. Foreign body or Injury not detected. Uterus:  Appropriate size for gestational age. Cervix 2-3 cm. Musculoskeletal: Normal range of motion. Neurological: Loss of strength or sensation not detected. Disorientation to time, person, place not detected. Skin: Lesions not detected. Rashes not detected. Extremities:  Trauma not detected. Swelling or edema not detected. Psychiatric: Abnormal mood or affect not detected.

## 2017-08-09 NOTE — IP AVS SNAPSHOT
[address] Patient: Sole Hidalgo MRN: [id number] :1995 Current Discharge Medication List  
  
ASK your doctor about these medications Dose & Instructions Dispensing Information Comments Morning Noon Evening Bedtime Iron 325 mg (65 mg iron) tablet Generic drug:  ferrous sulfate Your last dose was: Your next dose is:    
   
   
 Dose:  325 mg Take 325 mg by mouth daily. Refills:  0 PRENATA PO Your last dose was: Your next dose is:    
   
   
 Dose:  1 Tab Take 1 Tab by mouth daily. Refills:  0

## 2017-08-09 NOTE — IP AVS SNAPSHOT
Gio Killian 
 
 
 [address] [place] [address] Patient: Sole Hidalgo MRN: [id number] :1995 You are allergic to the following No active allergies Recent Documentation Height Weight Breastfeeding? BMI OB Status Smoking Status 1.549 m 78 kg Unknown 32.5 kg/m2 Recent pregnancy Never Smoker Unresulted Labs Order Current Status TYPE & SCREEN Preliminary result Emergency Contacts Name Discharge Info Relation Home Work Mobile Tex Dowell DISCHARGE CAREGIVER [3] Spouse [3]   [phone] About your hospitalization You were admitted on:  2017 You last received care in the:  [place] [address] You were discharged on:  2017 Unit phone number:  [phone] Why you were hospitalized Your primary diagnosis was:  Mild Preeclampsia Your diagnoses also included:  Labor And Delivery, Indication For Care Providers Seen During Your Hospitalizations Provider Role Specialty Primary office phone Nabila Meneses MD Attending Provider Obstetrics & Gynecology [phone] Your Primary Care Physician (PCP) Primary Care Physician Office Phone Office Fax NONE ** None ** ** None ** Follow-up Information Follow up With Details Comments Contact Info Nabila Meneses MD Schedule an appointment as soon as possible for a visit in 6 weeks iron infusion monday [address]  [address] [place] [address] 
[phone] Current Discharge Medication List  
  
ASK your doctor about these medications Dose & Instructions Dispensing Information Comments Morning Noon Evening Bedtime Iron 325 mg (65 mg iron) tablet Generic drug:  ferrous sulfate Your last dose was: Your next dose is:    
   
   
 Dose:  325 mg Take 325 mg by mouth daily. Refills:  0  PRENATA PO  
   
 Your last dose was: Your next dose is:    
   
   
 Dose:  1 Tab Take 1 Tab by mouth daily. Refills:  0 Discharge Instructions None Discharge Instructions Attachments/References DEPRESSION: POSTPARTUM (ENGLISH) BOTTLE-FEEDING (ENGLISH) POSTPARTUM (ENGLISH) Discharge Orders None Introducing [facility] SERVICES! [employer] introduces [employer] patient portal. Now you can access parts of your medical record, email your doctor's office, and request medication refills online. 1. In your internet browser, go to [email or web address] 2. Click on the First Time User? Click Here link in the Sign In box. You will see the New Member Sign Up page. 3. Enter your Marblar Access Code exactly as it appears below. You will not need to use this code after youve completed the sign-up process. If you do not sign up before the expiration date, you must request a new code. · Marblar Access Code: [email or web address] Expires: 10/24/2017  3:43 PM 
 
4. Enter the last four digits of your Social Security Number (xxxx) and Date of Birth (mm/dd/yyyy) as indicated and click Submit. You will be taken to the next sign-up page. 5. Create a Marblar ID. This will be your Marblar login ID and cannot be changed, so think of one that is secure and easy to remember. 6. Create a Marblar password. You can change your password at any time. 7. Enter your Password Reset Question and Answer. This can be used at a later time if you forget your password. 8. Enter your e-mail address. You will receive e-mail notification when new information is available in [address]. 9. Click Sign Up. You can now view and download portions of your medical record. 10. Click the Download Summary menu link to download a portable copy of your medical information.  
 
If you have questions, please visit the Frequently Asked Questions section of "[employer]. Remember, [employer] is NOT to be used for urgent needs. For medical emergencies, dial 911. Now available from your iPhone and Android! General Information Please provide this summary of care documentation to your next provider. Patient Signature:  ____________________________________________________________ Date:  ____________________________________________________________  
  
Elaine Nascimento Provider Signature:  ____________________________________________________________ Date:  ____________________________________________________________ More Information Depression After Childbirth: Care Instructions Your Care Instructions Many women get the \"baby blues\" during the first few days after childbirth. You may lose sleep, feel irritable, and cry easily. You may feel happy one minute and sad the next. Hormone changes are one cause of these emotional changes. Also, the demands of a new baby, along with visits from relatives or other family needs, add to a mother's stress. The \"baby blues\" often peak around the fourth day. Then they ease up in less than 2 weeks. If your moodiness or anxiety lasts for more than 2 weeks, or if you feel like life is not worth living, you may have postpartum depression. This is different for each mother. Some mothers with serious depression may worry intensely about their infant's well-being. Others may feel distant from their child. Some mothers might even feel that they might harm their baby. A mother may have signs of paranoia, wondering if someone is watching her. Depression is not a sign of weakness. It is a medical condition that requires treatment. Medicine and counseling often work well to reduce depression. Talk to your doctor about taking antidepressant medicine while breastfeeding. Follow-up care is a key part of your treatment and safety.  Be sure to make and go to all appointments, and call your doctor if you are having problems. It's also a good idea to know your test results and keep a list of the medicines you take. How do you know if you are depressed? With all the changes in your life, you may not know if you are depressed. Pregnancy sometimes causes changes in how you feel that are similar to the symptoms of depression. Symptoms of depression include: · Feeling sad or hopeless and losing interest in daily activities. These are the most common symptoms of depression. · Sleeping too much or not enough. · Feeling tired. You may feel as if you have no energy. · Eating too much or too little. · Writing or talking about death, such as writing suicide notes or talking about guns, knives, or pills. Keep the numbers for these national suicide hotlines: 5-038-568-TALK (3-143.945.1474) and 4-513-UZYEBCB (0-522.694.9847). If you or someone you know talks about suicide or feeling hopeless, get help right away. How can you care for yourself at home? · Be safe with medicines. Take your medicines exactly as prescribed. Call your doctor if you think you are having a problem with your medicine. · Eat a healthy diet so that you can keep up your energy. · Get regular daily exercise, such as walks, to help improve your mood. · Get as much sunlight as possible. Keep your shades and curtains open. Get outside as much as you can. · Avoid using alcohol or other substances to feel better. · Get as much rest and sleep as possible. Avoid doing too much. Being too tired can increase depression. · Play stimulating music throughout your day and soothing music at night. · Schedule outings and visits with friends and family. Ask them to call you regularly, so that you do not feel alone. · Ask for help with preparing food and other daily tasks. Family and friends are often happy to help a mother with a . · Be honest with yourself and those who care about you. Tell them about your struggle. · Join a support group of new mothers. No one can better understand the challenges of caring for a  than other new mothers. · If you feel like life is not worth living or are feeling hopeless, get help right away. Keep the numbers for these national suicide hotlines: 4-108-705-TALK (9-645.173.1981) and 6-160-ZZNEIYB (6-319.217.2161). When should you call for help? Call 911 anytime you think you may need emergency care. For example, call if: 
· You feel you cannot stop from hurting yourself, your baby, or someone else. Call your doctor now or seek immediate medical care if: 
· You are having trouble caring for yourself or your baby. · You hear voices. Watch closely for changes in your health, and be sure to contact your doctor if: 
· You have problems with your depression medicine. · You do not get better as expected. Where can you learn more? Go to http://fred-sommer.info/. Enter A138 in the search box to learn more about \"Depression After Childbirth: Care Instructions. \" Current as of: 2016 Content Version: 11.3 © 3420-1343 [employer]. Care instructions adapted under license by [employer] (which disclaims liability or warranty for this information). If you have questions about a medical condition or this instruction, always ask your healthcare professional. [place] [address] any warranty or liability for your use of this information. Bottle-Feeding: Care Instructions Your Care Instructions Your reasons for wanting to bottle-feed your baby with formula are personal. You and your partner can make the best decision for you and your baby. Formulas can provide all the calories and nutrients your baby needs in the first 6 months of life. Several types of formulas are available.  Most babies start with a cow's milkbased formula, such as Enfamil, Good Start, or Similac. Talk to your doctor before trying other types of formulas, which include soy and lactose-free formulas. At first, preparing the bottles and formula can seem confusing, but it gets easier and faster with some practice. Your  baby probably will want to eat every 2 to 3 hours. Do not worry about the exact timing for the first few weeks, but feed your baby whenever he or she is hungry. In general, your baby should not go longer than 4 hours without eating during the day for the first few months. Sit in a comfortable chair with your arms supported on pillows. Look into your baby's eyes and talk or sing while you are giving the bottle. Enjoy this special time you have with your baby. Follow-up care is a key part of your child's treatment and safety. Be sure to make and go to all appointments, and call your doctor if your child is having problems. It's also a good idea to know your child's test results and keep a list of the medicines your child takes. At each well-baby visit, talk to your doctor about your baby's nutritional needs, which change as he or she grows and develops. How can you care for yourself at home? · Prepare your supplies for bottle-feeding before your baby is born, if possible. ¨ Have a supply of small bottles (usually 4 ounces) for your baby's first few weeks. ¨ You may want to buy a variety of bottle nipples so you can see which type your baby likes. ¨ Before using bottles and nipples the first time, wash them in hot water and dish soap and rinse with hot water. · Ask your doctor which formula to use. You can buy formula as a liquid concentrate or a powder that you mix with water. Formulas also come in a ready-to-feed form. Always use formula with added iron unless the doctor says not to. · Make sure you have clean, safe water to mix with the formula.  If you are not sure if your water is safe, you can use bottled water or you can boil tap water. ¨ Boil cold tap water for 1 minute, then cool the water to room temperature. ¨ Use the boiled water to mix the formula within 30 minutes. · Wash your hands before preparing formula. · Read the label to see how much water to mix with the formula. If you add too little water, it can upset your baby's stomach. If you add too much water, your baby will not get the right nutrition. · Cover the prepared formula and store it in a refrigerator. Use it within 24 hours. · Soak dirty baby bottles in water and dish soap. Wash bottles and nipples in the upper rack of the  or hand-wash them in hot water with dish soap. To bottle-feed your baby · Warm the formula to room temperature or body temperature before feeding. The best way to warm it is in a bowl of heated water. Do not use a microwave, which can cause hot spots in the formula that can burn your baby's mouth. · Before feeding your baby, check the temperature of the formula by dripping 2 or 3 drops on the inside of your wrist. It should be warm, not cold or hot. · Place a bib or cloth under your baby's chin to help keep clothes clean. Have a second cloth handy to use when burping your baby. · Support your baby with one arm, with your baby's head resting in the bend of your elbow. Keep your baby's head higher than his or her chest. 
· Stroke the center of your baby's lower lip to encourage the mouth to open wider. A wide mouth will cover more of the nipple and will help reduce the amount of air the baby sucks in. · Angle the bottle so the neck of the bottle and the nipple stay full of milk. This helps reduce how much air your baby swallows. · Do not prop the bottle in your baby's mouth or let him or her hold it alone. This increases your baby's chances of choking or getting ear infections.  
· During the first few weeks, burp your baby after every 2 ounces of formula. This helps get rid of swallowed air and reduces spitting up. · You will know your baby is full when he or she stops sucking. Your baby may spit out the nipple, turn his or her head away, or fall asleep when full.  babies usually drink from 1 to 3 ounces each feeding. · Throw away any formula left in the bottle after you have fed your baby. Bacteria can grow in the leftover formula. · It can be helpful to hold your baby upright for about 30 minutes after eating to reduce spitting up. When should you call for help? Watch closely for changes in your child's health, and be sure to contact your doctor if: 
· Your child does not seem to be growing and gaining weight. · Your child has trouble passing stools, or his or her stools are hard and dry. · Your child is vomiting. · Your child has diarrhea or a skin rash. · Your child cries most of the time. · Your child has gas, bloating, or cramps after drinking a bottle. Where can you learn more? Go to http://fred-sommer.info/. Enter P111 in the search box to learn more about \"Bottle-Feeding: Care Instructions. \" Current as of: 2016 Content Version: 11.3 © 1423-7112 [employer]. Care instructions adapted under license by [employer] (which disclaims liability or warranty for this information). If you have questions about a medical condition or this instruction, always ask your healthcare professional. [place] [address] any warranty or liability for your use of this information. After Your Delivery (the Postpartum Period): Care Instructions Your Care Instructions Congratulations on the birth of your baby. Like pregnancy, the  period can be a time of excitement, hector, and exhaustion. You may look at your wondrous little baby and feel happy. You may also be overwhelmed by your new sleep hours and new responsibilities. At first, babies often sleep during the days and are awake at night. They do not have a pattern or routine. They may make sudden gasps, jerk themselves awake, or look like they have crossed eyes. These are all normal, and they may even make you smile. In these first weeks after delivery, try to take good care of yourself. It may take 4 to 6 weeks to feel like yourself again, and possibly longer if you had a  birth. You will likely feel very tired for several weeks. Your days will be full of ups and downs, but lots of hector as well. Follow-up care is a key part of your treatment and safety. Be sure to make and go to all appointments, and call your doctor if you are having problems. It's also a good idea to know your test results and keep a list of the medicines you take. How can you care for yourself at home? Take care of your body after delivery · Use pads instead of tampons for the bloody flow that may last as long as 2 weeks. · Ease cramps with ibuprofen (Advil, Motrin). · Ease soreness of hemorrhoids and the area between your vagina and rectum with ice compresses or witch hazel pads. · Ease constipation by drinking lots of fluid and eating high-fiber foods. Ask your doctor about over-the-counter stool softeners. · Cleanse yourself with a gentle squeeze of warm water from a bottle instead of wiping with toilet paper. · Take a sitz bath in warm water several times a day. · Wear a good nursing bra. Ease sore and swollen breasts with warm, wet washcloths. · If you are not breastfeeding, use ice rather than heat for breast soreness. · Your period may not start for several months if you are breastfeeding. You may bleed more, and longer at first, than you did before you got pregnant. · Wait until you are healed (about 4 to 6 weeks) before you have sexual intercourse. Your doctor will tell you when it is okay to have sex. · Try not to travel with your baby for 5 or 6 weeks.  If you take a long car trip, make frequent stops to walk around and stretch. Avoid exhaustion · Rest every day. Try to nap when your baby naps. · Ask another adult to be with you for a few days after delivery. · Plan for  if you have other children. · Stay flexible so you can eat at odd hours and sleep when you need to. Both you and your baby are making new schedules. · Plan small trips to get out of the house. Change can make you feel less tired. · Ask for help with housework, cooking, and shopping. Remind yourself that your job is to care for your baby. Know about help for postpartum depression · \"Baby blues\" are common for the first 1 to 2 weeks after birth. You may cry or feel sad or irritable for no reason. · Rest whenever you can. Being tired makes it harder to handle your emotions. · Go for walks with your baby. · Talk to your partner, friends, and family about your feelings. · If your symptoms last for more than a few weeks, or if you feel very depressed, ask your doctor for help. · Postpartum depression can be treated. Support groups and counseling can help. Sometimes medicine can also help. Stay healthy · Eat healthy foods so you have more energy, make good breast milk, and lose extra baby pounds. · If you breastfeed, avoid alcohol and drugs. Stay smoke-free. If you quit during pregnancy, congratulations. · Start daily exercise after 4 to 6 weeks, but rest when you feel tired. · Learn exercises to tone your belly. Do Kegel exercises to regain strength in your pelvic muscles. You can do these exercises while you stand or sit. ¨ Squeeze the same muscles you would use to stop your urine. Your belly and thighs should not move. ¨ Hold the squeeze for 3 seconds, and then relax for 3 seconds. ¨ Start with 3 seconds. Then add 1 second each week until you are able to squeeze for 10 seconds. ¨ Repeat the exercise 10 to 15 times for each session. Do three or more sessions each day. · Find a class for new mothers and new babies that has an exercise time. · If you had a  birth, give yourself a bit more time before you exercise, and be careful. When should you call for help? Call 911 anytime you think you may need emergency care. For example, call if: 
· You passed out (lost consciousness). Call your doctor now or seek immediate medical care if: 
· You have severe vaginal bleeding. This means you are passing blood clots and soaking through a pad each hour for 2 or more hours. · You are dizzy or lightheaded, or you feel like you may faint. · You have a fever. · You have new belly pain, or your pain gets worse. Watch closely for changes in your health, and be sure to contact your doctor if: 
· Your vaginal bleeding seems to be getting heavier. · You have new or worse vaginal discharge. · You feel sad, anxious, or hopeless for more than a few days. · You do not get better as expected. Where can you learn more? Go to http://fred-sommer.info/. Enter A461 in the search box to learn more about \"After Your Delivery (the Postpartum Period): Care Instructions. \" Current as of: 2017 Content Version: 11.3 © 0188-2075 [employer]. Care instructions adapted under license by [employer] (which disclaims liability or warranty for this information). If you have questions about a medical condition or this instruction, always ask your healthcare professional. [place] [address] any warranty or liability for your use of this information.

## 2017-08-10 ENCOUNTER — ANESTHESIA (OUTPATIENT)
Dept: LABOR AND DELIVERY | Age: 22
End: 2017-08-10
Payer: OTHER GOVERNMENT

## 2017-08-10 ENCOUNTER — ANESTHESIA EVENT (OUTPATIENT)
Dept: LABOR AND DELIVERY | Age: 22
End: 2017-08-10
Payer: OTHER GOVERNMENT

## 2017-08-10 LAB
HCT VFR BLD AUTO: 25.7 % (ref 35–45)
HGB BLD-MCNC: 8.3 G/DL (ref 12–16)

## 2017-08-10 PROCEDURE — 77030007879 HC KT SPN EPDRL TELE -B: Performed by: NURSE ANESTHETIST, CERTIFIED REGISTERED

## 2017-08-10 PROCEDURE — 74011250636 HC RX REV CODE- 250/636: Performed by: SPECIALIST

## 2017-08-10 PROCEDURE — 00HU33Z INSERTION OF INFUSION DEVICE INTO SPINAL CANAL, PERCUTANEOUS APPROACH: ICD-10-PCS | Performed by: ANESTHESIOLOGY

## 2017-08-10 PROCEDURE — 65270000029 HC RM PRIVATE

## 2017-08-10 PROCEDURE — 74011250636 HC RX REV CODE- 250/636: Performed by: NURSE ANESTHETIST, CERTIFIED REGISTERED

## 2017-08-10 PROCEDURE — 75410000003 HC RECOV DEL/VAG/CSECN EA 0.5 HR

## 2017-08-10 PROCEDURE — 77010026065 HC OXYGEN MINIMUM MEDICAL AIR

## 2017-08-10 PROCEDURE — 74011250636 HC RX REV CODE- 250/636

## 2017-08-10 PROCEDURE — 74011250637 HC RX REV CODE- 250/637: Performed by: SPECIALIST

## 2017-08-10 PROCEDURE — 36415 COLL VENOUS BLD VENIPUNCTURE: CPT | Performed by: OBSTETRICS & GYNECOLOGY

## 2017-08-10 PROCEDURE — 77030010848 HC CATH INTUTR PRSS KOLB -B

## 2017-08-10 PROCEDURE — 0DQR0ZZ REPAIR ANAL SPHINCTER, OPEN APPROACH: ICD-10-PCS | Performed by: OBSTETRICS & GYNECOLOGY

## 2017-08-10 PROCEDURE — 75410000000 HC DELIVERY VAGINAL/SINGLE

## 2017-08-10 PROCEDURE — 74011250637 HC RX REV CODE- 250/637: Performed by: OBSTETRICS & GYNECOLOGY

## 2017-08-10 PROCEDURE — 85018 HEMOGLOBIN: CPT | Performed by: OBSTETRICS & GYNECOLOGY

## 2017-08-10 PROCEDURE — 76060000078 HC EPIDURAL ANESTHESIA

## 2017-08-10 PROCEDURE — 75410000002 HC LABOR FEE PER 1 HR

## 2017-08-10 PROCEDURE — 74011000250 HC RX REV CODE- 250

## 2017-08-10 PROCEDURE — 3E0R3CZ INTRODUCE REGIONAL ANESTH IN SPINAL CANAL, PERC: ICD-10-PCS | Performed by: ANESTHESIOLOGY

## 2017-08-10 RX ORDER — OXYCODONE AND ACETAMINOPHEN 5; 325 MG/1; MG/1
2 TABLET ORAL
Status: DISCONTINUED | OUTPATIENT
Start: 2017-08-10 | End: 2017-08-12 | Stop reason: HOSPADM

## 2017-08-10 RX ORDER — ONDANSETRON 2 MG/ML
4 INJECTION INTRAMUSCULAR; INTRAVENOUS
Status: DISCONTINUED | OUTPATIENT
Start: 2017-08-10 | End: 2017-08-12 | Stop reason: HOSPADM

## 2017-08-10 RX ORDER — HYDROCORTISONE 25 MG/G
1 CREAM TOPICAL AS NEEDED
Status: DISCONTINUED | OUTPATIENT
Start: 2017-08-10 | End: 2017-08-12 | Stop reason: HOSPADM

## 2017-08-10 RX ORDER — CARBOPROST TROMETHAMINE 250 UG/ML
250 INJECTION, SOLUTION INTRAMUSCULAR
Status: DISCONTINUED | OUTPATIENT
Start: 2017-08-10 | End: 2017-08-12 | Stop reason: HOSPADM

## 2017-08-10 RX ORDER — HYDROMORPHONE HYDROCHLORIDE 1 MG/ML
1 INJECTION, SOLUTION INTRAMUSCULAR; INTRAVENOUS; SUBCUTANEOUS
Status: DISCONTINUED | OUTPATIENT
Start: 2017-08-10 | End: 2017-08-12 | Stop reason: HOSPADM

## 2017-08-10 RX ORDER — FENTANYL CITRATE 50 UG/ML
100 INJECTION, SOLUTION INTRAMUSCULAR; INTRAVENOUS ONCE
Status: COMPLETED | OUTPATIENT
Start: 2017-08-10 | End: 2017-08-10

## 2017-08-10 RX ORDER — OXYTOCIN/RINGER'S LACTATE 20/1000 ML
125 PLASTIC BAG, INJECTION (ML) INTRAVENOUS CONTINUOUS
Status: DISCONTINUED | OUTPATIENT
Start: 2017-08-10 | End: 2017-08-12 | Stop reason: HOSPADM

## 2017-08-10 RX ORDER — LIDOCAINE HYDROCHLORIDE 10 MG/ML
30 INJECTION, SOLUTION EPIDURAL; INFILTRATION; INTRACAUDAL; PERINEURAL AS NEEDED
Status: DISCONTINUED | OUTPATIENT
Start: 2017-08-10 | End: 2017-08-12 | Stop reason: HOSPADM

## 2017-08-10 RX ORDER — OXYTOCIN/RINGER'S LACTATE 20/1000 ML
500 PLASTIC BAG, INJECTION (ML) INTRAVENOUS ONCE
Status: COMPLETED | OUTPATIENT
Start: 2017-08-10 | End: 2017-08-10

## 2017-08-10 RX ORDER — FENTANYL CITRATE 50 UG/ML
INJECTION, SOLUTION INTRAMUSCULAR; INTRAVENOUS
Status: COMPLETED
Start: 2017-08-10 | End: 2017-08-10

## 2017-08-10 RX ORDER — FENTANYL CITRATE 50 UG/ML
INJECTION, SOLUTION INTRAMUSCULAR; INTRAVENOUS AS NEEDED
Status: DISCONTINUED | OUTPATIENT
Start: 2017-08-10 | End: 2017-08-10 | Stop reason: HOSPADM

## 2017-08-10 RX ORDER — METHYLERGONOVINE MALEATE 0.2 MG/ML
0.2 INJECTION INTRAVENOUS AS NEEDED
Status: DISCONTINUED | OUTPATIENT
Start: 2017-08-10 | End: 2017-08-12 | Stop reason: HOSPADM

## 2017-08-10 RX ORDER — IBUPROFEN 400 MG/1
800 TABLET ORAL
Status: DISCONTINUED | OUTPATIENT
Start: 2017-08-10 | End: 2017-08-12 | Stop reason: HOSPADM

## 2017-08-10 RX ORDER — AMOXICILLIN 250 MG
1 CAPSULE ORAL
Status: DISCONTINUED | OUTPATIENT
Start: 2017-08-10 | End: 2017-08-12 | Stop reason: HOSPADM

## 2017-08-10 RX ORDER — HYDROMORPHONE HYDROCHLORIDE 1 MG/ML
INJECTION, SOLUTION INTRAMUSCULAR; INTRAVENOUS; SUBCUTANEOUS
Status: COMPLETED
Start: 2017-08-10 | End: 2017-08-10

## 2017-08-10 RX ORDER — MAG HYDROX/ALUMINUM HYD/SIMETH 200-200-20
15 SUSPENSION, ORAL (FINAL DOSE FORM) ORAL
Status: DISCONTINUED | OUTPATIENT
Start: 2017-08-10 | End: 2017-08-12 | Stop reason: HOSPADM

## 2017-08-10 RX ORDER — CASTOR OIL 100 %
60 OIL (ML) ORAL
Status: COMPLETED | OUTPATIENT
Start: 2017-08-10 | End: 2017-08-10

## 2017-08-10 RX ORDER — TERBUTALINE SULFATE 1 MG/ML
0.25 INJECTION SUBCUTANEOUS
Status: DISCONTINUED | OUTPATIENT
Start: 2017-08-10 | End: 2017-08-12 | Stop reason: HOSPADM

## 2017-08-10 RX ORDER — ACETAMINOPHEN 325 MG/1
650 TABLET ORAL
Status: DISCONTINUED | OUTPATIENT
Start: 2017-08-10 | End: 2017-08-12 | Stop reason: HOSPADM

## 2017-08-10 RX ORDER — NALBUPHINE HYDROCHLORIDE 10 MG/ML
10 INJECTION, SOLUTION INTRAMUSCULAR; INTRAVENOUS; SUBCUTANEOUS
Status: DISCONTINUED | OUTPATIENT
Start: 2017-08-10 | End: 2017-08-12 | Stop reason: HOSPADM

## 2017-08-10 RX ORDER — MISOPROSTOL 200 UG/1
800 TABLET ORAL
Status: DISCONTINUED | OUTPATIENT
Start: 2017-08-10 | End: 2017-08-12 | Stop reason: HOSPADM

## 2017-08-10 RX ORDER — SODIUM CHLORIDE, SODIUM LACTATE, POTASSIUM CHLORIDE, CALCIUM CHLORIDE 600; 310; 30; 20 MG/100ML; MG/100ML; MG/100ML; MG/100ML
125 INJECTION, SOLUTION INTRAVENOUS CONTINUOUS
Status: DISCONTINUED | OUTPATIENT
Start: 2017-08-10 | End: 2017-08-12 | Stop reason: HOSPADM

## 2017-08-10 RX ORDER — OXYTOCIN 10 [USP'U]/ML
10 INJECTION, SOLUTION INTRAMUSCULAR; INTRAVENOUS
Status: DISCONTINUED | OUTPATIENT
Start: 2017-08-10 | End: 2017-08-12 | Stop reason: HOSPADM

## 2017-08-10 RX ORDER — PROMETHAZINE HYDROCHLORIDE 25 MG/ML
25 INJECTION, SOLUTION INTRAMUSCULAR; INTRAVENOUS
Status: DISCONTINUED | OUTPATIENT
Start: 2017-08-10 | End: 2017-08-12 | Stop reason: HOSPADM

## 2017-08-10 RX ORDER — ZOLPIDEM TARTRATE 5 MG/1
5 TABLET ORAL
Status: DISCONTINUED | OUTPATIENT
Start: 2017-08-10 | End: 2017-08-12 | Stop reason: HOSPADM

## 2017-08-10 RX ADMIN — SODIUM CHLORIDE, SODIUM LACTATE, POTASSIUM CHLORIDE, AND CALCIUM CHLORIDE 125 ML/HR: 600; 310; 30; 20 INJECTION, SOLUTION INTRAVENOUS at 07:00

## 2017-08-10 RX ADMIN — Medication 500 ML/HR: at 10:03

## 2017-08-10 RX ADMIN — HYDROMORPHONE HYDROCHLORIDE 1 MG: 1 INJECTION, SOLUTION INTRAMUSCULAR; INTRAVENOUS; SUBCUTANEOUS at 00:30

## 2017-08-10 RX ADMIN — PENICILLIN G POTASSIUM 2.5 MILLION UNITS: 20000000 POWDER, FOR SOLUTION INTRAVENOUS at 04:08

## 2017-08-10 RX ADMIN — IBUPROFEN 800 MG: 400 TABLET, FILM COATED ORAL at 23:31

## 2017-08-10 RX ADMIN — Medication 10 ML/HR: at 03:56

## 2017-08-10 RX ADMIN — SODIUM CHLORIDE, SODIUM LACTATE, POTASSIUM CHLORIDE, AND CALCIUM CHLORIDE 125 ML/HR: 600; 310; 30; 20 INJECTION, SOLUTION INTRAVENOUS at 13:05

## 2017-08-10 RX ADMIN — FENTANYL CITRATE 100 MCG: 50 INJECTION, SOLUTION INTRAMUSCULAR; INTRAVENOUS at 03:55

## 2017-08-10 RX ADMIN — IBUPROFEN 800 MG: 400 TABLET, FILM COATED ORAL at 15:08

## 2017-08-10 RX ADMIN — FENTANYL CITRATE 100 MCG: 50 INJECTION, SOLUTION INTRAMUSCULAR; INTRAVENOUS at 03:54

## 2017-08-10 RX ADMIN — SODIUM CHLORIDE, SODIUM LACTATE, POTASSIUM CHLORIDE, AND CALCIUM CHLORIDE 1000 ML: 600; 310; 30; 20 INJECTION, SOLUTION INTRAVENOUS at 03:37

## 2017-08-10 RX ADMIN — PENICILLIN G POTASSIUM 2.5 MILLION UNITS: 20000000 POWDER, FOR SOLUTION INTRAVENOUS at 00:35

## 2017-08-10 RX ADMIN — PENICILLIN G POTASSIUM 2.5 MILLION UNITS: 20000000 POWDER, FOR SOLUTION INTRAVENOUS at 08:55

## 2017-08-10 RX ADMIN — CASTOR OIL 60 ML: 100 LIQUID ORAL at 09:00

## 2017-08-10 RX ADMIN — NALBUPHINE HYDROCHLORIDE 10 MG: 10 INJECTION, SOLUTION INTRAMUSCULAR; INTRAVENOUS; SUBCUTANEOUS at 02:52

## 2017-08-10 NOTE — PROGRESS NOTES
Transferred to [facility] [phone]. Resting comfortably in bed, side rails up, call bell within reach, spouse at bedside with infant. Instructed patient to call for assistance to bathroom and do not ambulate without staff.

## 2017-08-10 NOTE — PROGRESS NOTES
progress Note    Patient: Juve Johnson MRN: [id number]  SSN: [id number]    YOB: 1995  Age: 23 y.o. Sex: female      Subjective:     Apgars, Weight and Length:  Information for the patient's :  Crescencio Hathaway [[id number]]   One Minute Apgar: 3 (Filed from Delivery Summary)  Five  Minute Apgar: 8 (Filed from Delivery Summary)       Feeding Method: Infant Feeding: Formula      [place] has normal post vaginal delivery complaints and concerns. We talked about breast milk coming in and how to deal with that. Objective:      Uterine Fundus:  Firm lochia clearing or normal.      Lab/Data Review:  Visit Vitals    /68    Pulse 99    Temp 98.2 °F (36.8 °C)    Resp 20    Ht 5' 1\" (1.549 m)    Wt 78 kg (172 lb)    SpO2 100%    Breastfeeding Unknown    BMI 32.5 kg/m2       Temp (24hrs), Av.5 °F (31.9 °C), Min:37.4 °F (3 °C), Max:98.6 °F (37 °C)      WBC   Date/Time Value Ref Range Status   2017 02:30 PM 10.4 4.6 - 13.2 K/uL Final   2017 06:00 PM 14.9 (H) 4.6 - 13.2 K/uL Final   2017 01:00 PM 9.4 4.0 - 11.0 1000/mm3 Final     HGB   Date/Time Value Ref Range Status   08/10/2017 02:55 PM 8.3 (L) 12.0 - 16.0 g/dL Final   2017 02:30 PM 10.9 (L) 12.0 - 16.0 g/dL Final   2017 06:00 PM 9.3 (L) 12.0 - 16.0 g/dL Final     PLATELET   Date/Time Value Ref Range Status   2017 02:30  135 - 420 K/uL Final     Hgb, External   Date/Time Value Ref Range Status   2017 12.7  Final     Platelet cnt., External   Date/Time Value Ref Range Status   2017 325  Final       Prenatal Labs:  Lab Results   Component Value Date/Time    Rubella, External immune 2017    GrBStrep, External positive 2017         Assessment:     Status post: Vacuum and PP bleeding CW bout 1000 cc and hgb drop  appropriate but more symptoms than that. Abdomen exam bening, now did urinate and got up well.  Will leave heplock but got transfusion consent and discussed as any further symptoms or concern about blood loss issues will give 2CPRBC. Plan:     Possible transfusion for future hgb under 8 or symptoms  . trTransfusion of blood products discussion. The patient has a low hemoglobin and/or symptomatic anemia enough to warrant transfusion. I discussed the benefits and risks of transfusion including the small chance of acquiring HIV, hepatitis, and transfusion reactions. We discussed giving pre transfusion medications to decrease the chance of this. The option of not giving blood was also made clear in which case she will probably slowly improve but runs, in my opinion more risk to her health than not getting the transfusion. She agrees and the transfusion will be started soon.      Phong Saldivar MD  8/10/2017    Signed By: Phong Saldivar MD     August 10, 2017

## 2017-08-10 NOTE — ANESTHESIA PROCEDURE NOTES
Epidural Block    Start time: 8/10/2017 3:34 AM  End time: 8/10/2017 4:06 AM  Performed by: Niko Geiger  Authorized by: Niko Geiger     Pre-Procedure  Indication: at surgeon's request, primary anesthetic and labor epidural    Preanesthetic Checklist: patient identified, risks and benefits discussed, anesthesia consent, site marked, patient being monitored, timeout performed and anesthesia consent    Timeout Time: 03:34        Epidural:   Patient position:  Seated  Prep region:  Lumbar  Prep: Betadine and Patient draped    Location:  L3-4    Needle and Epidural Catheter:   Needle Type:  Tuohy  Needle Gauge:  18 G  Injection Technique:  Loss of resistance using saline  Attempts:  1  Catheter Size:  20 G  Catheter at Skin Depth (cm):  10  Depth in Epidural Space (cm):  4  Events: no blood with aspiration, no cerebrospinal fluid with aspiration and no paresthesia    Test Dose:  Negative and lidocaine 1.5% w/ epi    Assessment:   Catheter Secured:  Tegaderm and tape  Insertion:  Uncomplicated  Patient tolerance:  Patient tolerated the procedure well with no immediate complications  For Vitals see nursing notes.       Serjio Lisa CRNA  4:06 AM

## 2017-08-10 NOTE — PROGRESS NOTES
LABOR PROGRESS NOTE   8/10/2017  7:02 AM   Patient's Name:  Shyam Washington. MRN: [id number]. : 1995    ASSESSMENT   Elier's Estimated Gestational Age: 38w1d weeks. Got her Epidural around 3:30 AM at 4-5 cm dilation. Now excellent contraction pattern. Anterior Lip, 0 station. Comfortable. Progressing well/ normally. Anticipate pushing in next hour or so. Discussed change of shift and Dr. Tani Brito coming on call. .  CERVICAL EXAM: (data input under \"more activities\" and \"flowsheets\" at the top.)  Cervical Exam  Dilation (cm): 9  Eff: 100 %  Station: -1  Position: Posterior  Cervical Consistency: Soft  Vaginal exam done by? : dr Yonas Moser  Baby Position: Vertex  Membrane Status: AROM    FETAL MONITORING:  Baseline FHR: Patient Vitals for the past 2 hrs: Mode Fetal Heart Rate Fetal Activity Variability Decelerations Accelerations RN Reviewed Strip?   08/10/17 0615 (P) External (P) 125 (P) Present (P) 6-25 BPM (P) Early (P) Yes (P) Yes   08/10/17 0600 External 132 Present 6-25 BPM Early - -   08/10/17 0545 External 132 Present 6-25 BPM Early No Yes   08/10/17 0530 External 125 Present 6-25 BPM Early Yes Yes   08/10/17 0515 External 130 Present 6-25 BPM Early No Yes       Elier's  level of comfort is: good. .  Elier's progress was discussed with her. PLAN    Continue current course with watchful waiting in anticipation of progress.   Continue plan for vaginal delivery  OBJECTIVE DATA / PHYSICAL EXAM     Visit Vitals    /85    Pulse 99    Temp (!) 37.4 °F (3 °C)    Resp 16    Ht 5' 1\" (1.549 m)    Wt 78 kg (172 lb)    SpO2 100%    BMI 32.5 kg/m2       WBC   Date/Time Value Ref Range Status   2017 02:30 PM 10.4 4.6 - 13.2 K/uL Final   2017 06:00 PM 14.9 (H) 4.6 - 13.2 K/uL Final   2017 01:00 PM 9.4 4.0 - 11.0 1000/mm3 Final     HGB   Date/Time Value Ref Range Status   2017 02:30 PM 10.9 (L) 12.0 - 16.0 g/dL Final   2017 06:00 PM 9.3 (L) 12.0 - 16.0 g/dL Final 01/19/2017 01:00 PM 12.7 (L) 13.0 - 17.2 gm/dl Final     PLATELET   Date/Time Value Ref Range Status   08/09/2017 02:30  135 - 420 K/uL Final     Hgb, External   Date/Time Value Ref Range Status   01/19/2017 12.7  Final     Platelet cnt., External   Date/Time Value Ref Range Status   01/19/2017 325  Final     AUTHOR:  Gavin Monae MD  August 10, 2017 7:02 AM

## 2017-08-10 NOTE — PROGRESS NOTES
Bedside and Verbal shift change report given to Sarwat Issa RN (oncoming nurse) by Carmelo Burroughs RN (offgoing nurse). Report included the following information SBAR, Kardex and MAR.

## 2017-08-10 NOTE — PROGRESS NOTES
Assumed care of pt. Pt in bed breathing with contractions with spouse very supportive at bedside. Introduced myself, updated whiteboard. Talked about Pitocin management (now on 10 mu/min) and the importance of monitoring contractions and FHT. Pt verbalized understanding to education. Pt is GBS +,  Next dose due soon. /86, Pt denies headache, visual disturbance. No edema present. Reflexes WNL. No clonus. Pt plans for epidural when more uncomfortable. Will continue induction plan of care.

## 2017-08-10 NOTE — ANESTHESIA PREPROCEDURE EVALUATION
Anesthetic History   No history of anesthetic complications            Review of Systems / Medical History  Patient summary reviewed, nursing notes reviewed and pertinent labs reviewed    Pulmonary  Within defined limits                 Neuro/Psych   Within defined limits           Cardiovascular  Within defined limits  Hypertension: well controlled              Exercise tolerance: >4 METS     GI/Hepatic/Renal  Within defined limits              Endo/Other  Within defined limits           Other Findings              Physical Exam    Airway  Mallampati: II  TM Distance: 4 - 6 cm  Neck ROM: normal range of motion   Mouth opening: Normal     Cardiovascular  Regular rate and rhythm,  S1 and S2 normal,  no murmur, click, rub, or gallop             Dental  No notable dental hx       Pulmonary  Breath sounds clear to auscultation               Abdominal  GI exam deferred       Other Findings            Anesthetic Plan    ASA: 2  Anesthesia type: epidural            Anesthetic plan and risks discussed with: Patient

## 2017-08-10 NOTE — PROGRESS NOTES
Problem: Falls - Risk of  Goal: *Absence of Falls  Document Sherine Fall Risk and appropriate interventions in the flowsheet.   Outcome: Progressing Towards Goal  Fall Risk Interventions:

## 2017-08-10 NOTE — PROGRESS NOTES
Destiney Johnson MD  [address] [facility]  [address] [facility], [address]  [phone]                 Labor progress note:       Here to evaluate labor progress. Estimated Gestational Age: 43w4d       Historical Additional  Problem List.: Problem List as of 8/10/2017  Date Reviewed: 7/29/2017          Codes Class Noted - Resolved    Labor and delivery, indication for care ICD-10-CM: O75.9  ICD-9-CM: 659.90  8/10/2017 - Present        * (Principal)Mild preeclampsia ICD-10-CM: O14.00  ICD-9-CM: 642.40  8/9/2017 - Present        Proteinuria affecting pregnancy in third trimester ICD-10-CM: O12.13  ICD-9-CM: 646.23, 791.0  7/28/2017 - Present        RESOLVED: Episodic headache ICD-10-CM: R51  ICD-9-CM: 784.0  7/28/2017 - 7/29/2017               Baseline FHR: Patient Vitals for the past 2 hrs: Mode Fetal Heart Rate Fetal Activity Variability Decelerations Accelerations RN Reviewed Strip? FHR Interventions   08/10/17 0700 External 132 Present 6-25 BPM None Yes Yes -   08/10/17 0645 External 132 Present 6-25 BPM None No Yes -   08/10/17 0630 External 130 Present 6-25 BPM (!) Early; Late No Yes IV Fluid Bolus;Vaginal Exam;Oxygen        Previous pelvic exam:CervicalExam:9/100 %/-1/Posterior      Cervical Exam  Dilation (cm): 9  Eff: 100 %  Station: -1  Position: Posterior  Cervical Consistency: Soft  Vaginal exam done by? : dr Toñito Tilley  Baby Position: Vertex  Membrane Status: AROM     Membranes  Membrane Status: AROM  Rupture Date: 08/10/17  Rupture Time: 0019  Amniotic Fluid Description: Clear     Baseline FHR: Patient Vitals for the past 4 hrs: Mode Fetal Heart Rate Fetal Activity Variability Decelerations Accelerations RN Reviewed Strip? FHR Interventions   08/10/17 0700 External 132 Present 6-25 BPM None Yes Yes -   08/10/17 0645 External 132 Present 6-25 BPM None No Yes -   08/10/17 0630 External 130 Present 6-25 BPM (!) Early; Late No Yes IV Fluid Bolus;Vaginal Exam;Oxygen 08/10/17 0615 External 125 Present 6-25 BPM Early Yes Yes -   08/10/17 0600 External 132 Present 6-25 BPM Early - - -   08/10/17 0545 External 132 Present 6-25 BPM Early No Yes -   08/10/17 0530 External 125 Present 6-25 BPM Early Yes Yes -   08/10/17 0515 External 130 Present 6-25 BPM Early No Yes -   08/10/17 0500 External 135 Present 6-25 BPM Early Yes Yes -   08/10/17 0445 External 135 Present 6-25 BPM Early Yes Yes -   08/10/17 0430 External 135 Present 6-25 BPM Early Yes Yes -        Visit Vitals    /87    Pulse 99    Temp 97.8 °F (36.6 °C)    Resp 20    Ht 5' 1\" (1.549 m)    Wt 78 kg (172 lb)    SpO2 100%    BMI 32.5 kg/m2       Temp (24hrs), Av.2 °F (30.1 °C), Min:37.4 °F (3 °C), Max:98.6 °F (37 °C)      WBC   Date/Time Value Ref Range Status   2017 02:30 PM 10.4 4.6 - 13.2 K/uL Final   2017 06:00 PM 14.9 (H) 4.6 - 13.2 K/uL Final   2017 01:00 PM 9.4 4.0 - 11.0 1000/mm3 Final     HGB   Date/Time Value Ref Range Status   2017 02:30 PM 10.9 (L) 12.0 - 16.0 g/dL Final   2017 06:00 PM 9.3 (L) 12.0 - 16.0 g/dL Final   2017 01:00 PM 12.7 (L) 13.0 - 17.2 gm/dl Final     PLATELET   Date/Time Value Ref Range Status   2017 02:30  135 - 420 K/uL Final     Hgb, External   Date/Time Value Ref Range Status   2017 12.7  Final     Platelet cnt., External   Date/Time Value Ref Range Status   2017 325  Final        Elier's progress was discussed,as well as her continued options. Plan: Will continue to monitor her labor as appropriate. **a few prolonged decels but pushing well now very well and could be operative vag delivery if needed      The patient is doing well and as a precaution I did discuss  section and  operative vaginal delivery with benefits and risks associated.   We as a routine also give written consents for the option of declining a  section electively and the use of operative vaginal delivery using the vacuum extractor and/or forceps so she has had plenty of time to decide in advance if these options are right for her. I will go over them again if the need arises but when that occurs it is frequently more urgent so this has given her the opportunity for a well thought out decision.          Baltazar Baron MD

## 2017-08-10 NOTE — L&D DELIVERY NOTE
Delivery Summary    Patient: Jefe Rasmussen MRN: [id number]  SSN: [id number]    YOB: 1995  Age: 23 y.o. Sex: female        Labor Events:    Labor: No    Rupture Date: 8/10/2017    Rupture Time: 12:19 AM    Rupture Type: AROM    Amniotic Fluid Volume:      Amniotic Fluid Description:  Amniotic Fluid Odor: Clear    None     Induction: Oxytocin        Induction Date:        Induction Time:       Indications for Induction: Gestational Hypertension     Augmentation: AROM    Augmentation Date: 8/10/2017    Augmentation Time: 12:19 AM    Indications for Augmentation:      Cervical Ripening:       None    Rupture Identifier: Rupture 1     Labor complications: None     Additional complications:           Delivery Events:  Episiotomy: None    Indications for Episiotomy:      Laceration(s): Third degree perineal      Repaired: Yes     Number of Repair Packets: 2    Suture Type and Size:       Estimated Blood Loss (ml): 500.00      Information for the patient's :  Jyotsna Sykes [[id number]]         Delivery Summary - Baby    Delivery Date: 8/10/2017    Delivery Time: 10:00 AM   Delivery Type: Vaginal, Vacuum (Extractor)    Vacuum pressure did not exceed 600 mmHg. Sex:  female     Gestational Age: 43w4d  Delivery Clinician:  LILLY RAMON   Living?: Living  Delivery Location: L&D           APGARS  One minute Five minutes Ten minutes   Skin color: 0   1   1     Heart rate: 1   2   2     Grimace: 0   2   2     Muscle tone: 1   2   2     Breathin   1   1     Totals: 3   8   8         Presentation: Vertex    Position:   Occiput Anterior  Resuscitation Method:  PPV;Suctioning-deep; Tactile Stimulation;C-PAP     Meconium Stained: None      Cord Vessels: 3 Vessels      Cord Events:    Cord Blood Sent?:  Yes    Blood Gases Sent?:  Yes    Placenta:  Date/Time: 8/10 10:05 AM  Removal: Spontaneous      Appearance: Normal     [place] Measurements:  Birth Weight: 3.04 kg      Birth Length: 52 cm Head Circumference: 34 cm      Chest Circumference: 31 cm     Abdominal Girth: 32 cm    Other Providers:     ;JUAN CARLOS WATSON;IAN MOTA;;COURT RICARDO Obstetrician;Primary Nurse;Primary [place] Nurse;Nicu Nurse;Neonatologist;Anesthesiologist;Crna;Nurse Practitioner;Midwife;Nursery Nurse     The indication, risks, and benefits of vacuum delivery compared to  delivery were discussed with the patient and attendant family member. All questions were answered. Bladder was drained prior to instrumentation. Instrumentation easily achieved and positioning was checked and verified prior to attempt at deliver. Vacuum attempted? No    Vacuum indication: Maternal Fatigue   Vacuum type: mushroom cup   Application location: Low   First Attempt     Time applied: 8/10/2017  9:51 AM   Time removed: 8/10/2017 10:00 AM   Second Attempt    Time applied:     Time removed: Third Attempt    Time applied:     Time removed:     Number of pulls: 2   Number of pop-offs: 0   Low-end pressure range: 10 pressure released between pushing   High-end pressure range: 60 during contractions and patient pushing   Total application time: 9 minutes   Applied by: Josiah Dawn MD   Failed?  0       Group Beta Strep:   Lab Results   Component Value Date/Time    GrBStrep, External positive 2017        Cord Blood Results:  Information for the patient's :  Quinn Cross [[id number]]     Lab Results   Component Value Date/Time    ABORH O POSITIVE 08/10/2017 11:00 AM    PCTDIG NEG 08/10/2017 11:00 AM     Information for the patient's :  Quinn Cross [[id number]]     Lab Results   Component Value Date/Time    PHI 7.325 (L) 08/10/2017 10:37 AM    PCO2I 36.8 08/10/2017 10:37 AM    PO2I 35 (LL) 08/10/2017 10:37 AM    HCO3I 19.1 (L) 08/10/2017 10:37 AM    SO2I 64 (L) 08/10/2017 10:37 AM    IBD 7 08/10/2017 10:37 AM      Information for the patient's :  Quinn Cross [[id number]]   No results found for: EPHV, PCO2V, PO2V, HCO3V, O2STV, EBDV    Pt had exhaustion and at crowing so re discussed vacuum RBA, applied and easy delivery over two contractions, ,mild atony, 3Rd degree tear and OOO vicryl repair in layers.     ...  [id number] note dictated

## 2017-08-10 NOTE — PROGRESS NOTES
Spoke with dr. Bernadette Spann concerning pt unable to dangle at bedside without becoming symptomatic with drop in Bp- aware have tried twice with bp falling to 80-90/40's- pt eaten lunch and 2 shaun bars- juice, soda and a nap - pt currently in a modified chair position in bed- tolerating at present- rails remain up call bell in reach- pt declines motrin etc with sl urge to void at present- coping well

## 2017-08-10 NOTE — PROGRESS NOTES
~~0335 Pt sitting on side of bed in preparation for epidural.    ~~  0343 Anesthesia in room. ~~0344 Timeout. ~~ 0350Procedure start. ~~ 0353Cath in.    ~~ 0354Test dose.

## 2017-08-10 NOTE — ROUTINE PROCESS
1655--Bedside and Verbal shift change report given to Rich Berrios RN (oncoming nurse) by Dong Mann RN (offgoing nurse). Report included the following information SBAR, Kardex, Intake/Output, MAR and Recent Results. 1700--Assessment completed. Vitals stable. Fundus firm with massage, lochia small. Educated patient on normal bleeding and when to call nurse. Educated patient on emptying bladder. Patient verbalizes understanding that she will call nurse for assistance with getting OOB. LR @125, IV line patent without complications. 1811--Mother doing well. Up without complaints. Voiding without problems. Pain managed well with motrin and ice packs. Bonding well with baby.

## 2017-08-10 NOTE — ROUTINE PROCESS
Bedside and Verbal shift change report given to Adriel Alfaro RN and Harpal Taylor RN (oncoming nurse) by [place] Ebheather RN (offgoing nurse). Report included the following information SBAR, Kardex and MAR.

## 2017-08-10 NOTE — H&P
LABOR PROGRESS NOTE   8/10/2017  12:35 AM   Patient's Name:  An Murcia. MRN: [id number]. : 1995    ASSESSMENT   Elier's Estimated Gestational Age: 38w1d weeks Induced for mild preeclampsia. Slow progress. Exam at midnight 3-4 cm allowed AROM fluid clear. IUPC placed. Pitocin slowed due to mild tachysystole. .  CERVICAL EXAM: (data input under \"more activities\" and \"flowsheets\" at the top.)  Cervical Exam  Dilation (cm): 3 (3-4)  Eff: 50 %  Station: Floating  Position: Posterior  Cervical Consistency: Soft  Vaginal exam done by? : dr Madhavi Vera Status: AROM    FETAL MONITORING:  Baseline FHR: Patient Vitals for the past 2 hrs: Mode Fetal Heart Rate Fetal Activity Variability Decelerations Accelerations RN Reviewed Strip?   17 2315 External 135 Present 6-25 BPM None Yes Yes   17 2300 External 120 Present 6-25 BPM None Yes Yes   17 2245 External 125 Present 6-25 BPM None Yes Yes     Elier's  level of comfort is: good. Elier's progress was discussed with her. PLAN    Continue current course with watchful waiting in anticipation of progress.   Continue plan for vaginal delivery  OBJECTIVE DATA / PHYSICAL EXAM     Visit Vitals    /85    Pulse 76    Temp 98.5 °F (36.9 °C)    Resp 16    Ht 5' 1\" (1.549 m)    Wt 78 kg (172 lb)    BMI 32.5 kg/m2       WBC   Date/Time Value Ref Range Status   2017 02:30 PM 10.4 4.6 - 13.2 K/uL Final   2017 06:00 PM 14.9 (H) 4.6 - 13.2 K/uL Final   2017 01:00 PM 9.4 4.0 - 11.0 1000/mm3 Final     HGB   Date/Time Value Ref Range Status   2017 02:30 PM 10.9 (L) 12.0 - 16.0 g/dL Final   2017 06:00 PM 9.3 (L) 12.0 - 16.0 g/dL Final   2017 01:00 PM 12.7 (L) 13.0 - 17.2 gm/dl Final     PLATELET   Date/Time Value Ref Range Status   2017 02:30  135 - 420 K/uL Final     Hgb, External   Date/Time Value Ref Range Status   2017 12.7  Final     Platelet cnt., External   Date/Time Value Ref Range Status   01/19/2017 325  Final       AUTHOR:  Faith Wright MD  August 10, 2017 12:35 AM

## 2017-08-11 LAB
HCT VFR BLD AUTO: 21.2 % (ref 35–45)
HGB BLD-MCNC: 6.7 G/DL (ref 12–16)

## 2017-08-11 PROCEDURE — 65270000029 HC RM PRIVATE

## 2017-08-11 PROCEDURE — 85018 HEMOGLOBIN: CPT | Performed by: OBSTETRICS & GYNECOLOGY

## 2017-08-11 PROCEDURE — 36415 COLL VENOUS BLD VENIPUNCTURE: CPT | Performed by: OBSTETRICS & GYNECOLOGY

## 2017-08-11 PROCEDURE — 74011250637 HC RX REV CODE- 250/637: Performed by: OBSTETRICS & GYNECOLOGY

## 2017-08-11 PROCEDURE — 85014 HEMATOCRIT: CPT | Performed by: OBSTETRICS & GYNECOLOGY

## 2017-08-11 RX ORDER — IRON POLYSACCHARIDE COMPLEX 150 MG
1 CAPSULE ORAL DAILY
Status: DISCONTINUED | OUTPATIENT
Start: 2017-08-11 | End: 2017-08-12 | Stop reason: HOSPADM

## 2017-08-11 RX ADMIN — IBUPROFEN 800 MG: 400 TABLET, FILM COATED ORAL at 17:23

## 2017-08-11 RX ADMIN — IBUPROFEN 800 MG: 400 TABLET, FILM COATED ORAL at 08:39

## 2017-08-11 RX ADMIN — Medication 150 MG: at 09:40

## 2017-08-11 NOTE — PROGRESS NOTES
Baby announcement.     [address] [facility]   Staff [address] [facility]   [phone]

## 2017-08-11 NOTE — ROUTINE PROCESS
Bedside shift change report given to BARRERA Alexis RN  (oncoming nurse) by SUGAR Lundy RN (offgoing nurse). Report included the following information SBAR, Kardex, Intake/Output, MAR and Recent Results.

## 2017-08-11 NOTE — ROUTINE PROCESS
Bedside shift change report given to samuel harrington rn(oncoming nurse) by surekha Car rn (offgoing nurse). Report included the following information Kardex, Procedure Summary, Intake/Output, MAR and Recent Results.

## 2017-08-11 NOTE — ANESTHESIA POSTPROCEDURE EVALUATION
Post-Anesthesia Evaluation and Assessment    Patient: Placido Enamorado MRN: [id number]  SSN: [id number]    YOB: 1995  Age: 23 y.o. Sex: female      Data from PACU flowsheet    Cardiovascular Function/Vital Signs  Visit Vitals    /77 (BP 1 Location: Right arm, BP Patient Position: At rest;Head of bed elevated (Comment degrees))    Pulse 90    Temp 36.8 °C (98.3 °F)    Resp 18    Ht 5' 1\" (1.549 m)    Wt 78 kg (172 lb)    SpO2 99%    Breastfeeding Unknown    BMI 32.5 kg/m2       Patient is status post anesthesia    Nausea/Vomiting: controlled    Postoperative hydration reviewed and adequate. Pain:  Managed    Neurological Status: At baseline    Mental Status and Level of Consciousness: Alert and oriented     Pulmonary Status:   Adequate oxygenation and airway patent    Complications related to anesthesia: None    Post-anesthesia assessment completed.  No concerns    Signed By: Rusty Beltre CRNA     August 11, 2017

## 2017-08-11 NOTE — OP NOTES
Christian Rust    Name:  Timoteo Terry  MR#:  [id number]  :  1995  Account #:  [de-identified]  Date of Adm:  2017  Date of Surgery:  08/10/2017      PREOPERATIVE DIAGNOSIS: Maternal exhaustion, +2 to 3 station,  . POSTOPERATIVE DIAGNOSIS:    PROCEDURES PERFORMED: Vacuum delivery of viable female  infant. ESTIMATED BLOOD LOSS: 500 mL. SPECIMENS REMOVED: None. IMPLANTS: None. ANESTHESIA: Epidural anesthesia. OPERATIVE PROCEDURE: The patient had been pushing for a  couple of hours, was exhausted, not making further progress. Three  times during this process, I had given her and her  a thorough  benefit and risk analysis of operative vaginal delivery. Since the head  was at +2 to 3, vacuum seemed indicated. Heart rate at this time was  fine, although previously there had been some areas of concern and  tachycardia. Pediatrics was nevertheless called because of the  operative vaginal delivery. Her bladder was drained and the mushroom vacuum was applied,  vacuum to 600 is indicated. Over 2 simple pulls and contractions, the  head was easily delivered. Although there was good perineal body,  there was a third-degree tear. Placenta was delivered intact; however,  there were some trailing membranes and she was warned about that  as well as the nurse in the room, documented. There was a mild  amount of atony. When the baby was delivered, it was immediately noted the baby was  very floppy with poor tone, so the cord was rapidly doubly clamped and  cut and the baby given to the waiting pediatrician. Following this, repair  was undertaken with 3-0 Vicryl in multiple layers, first 4 sutures figure-  of-eights were placed in the rectal sphincter.  The perineal body was  thickened using 0 Vicryl and then 3-0 Vicryl was used to run the  vaginal mucosa to the hymenal ring were a  suture stitch was used,  brought down through the perineal body and then back up subcuticular  and tied. Bleeding at this time was controlled and minimal, although  there was still a mild trickle. A postprocedure pause was done. Sponge and needle counts were  correct.         Jovani Wade MD    [address] [facility] / JARETH  D:  08/10/2017   13:05  T:  08/10/2017   20:28  Job #:  [id number]

## 2017-08-11 NOTE — ROUTINE PROCESS
Bedside and Verbal shift change report given to Mei Mistry RN (oncoming nurse) by Sandy Dallas RN (offgoing nurse). Report included the following information SBAR, Procedure Summary, Intake/Output, MAR and Recent Results.

## 2017-08-11 NOTE — PROGRESS NOTES
PPD # 1    Patient doing well post-partum without significant complaint. No HA, no SOB, no chest pain. Walking in the room. Voiding without difficulty, normal lochia. Bottlefeeding. Baby stable. Vitals:  Patient Vitals for the past 8 hrs:   BP Temp Pulse Resp SpO2   17 0821 129/77 98.5 °F (36.9 °C) (!) 104 16 99 %   17 0518 136/81 98.2 °F (36.8 °C) 100 19 98 %     Temp (24hrs), Av.4 °F (36.9 °C), Min:98.2 °F (36.8 °C), Max:98.6 °F (37 °C)      Vital signs stable, afebrile. Exam:  Patient without distress. Breasts intact and nontender               Abdomen soft, fundus firm at level of umbilicus, nontender               Perineum with normal lochia noted. Lower extremities are negative for swelling, cords or tenderness. Lab/Data Review: All lab results for the last 24 hours reviewed. Lab Results  Component Value Date/Time   WBC 10.4 2017 02:30 PM   HGB 6.7 2017 06:33 AM   HCT 21.2 2017 06:33 AM   PLATELET 582  02:30 PM   MCV 90.1 2017 02:30 PM   Hgb, External 12.7 2017   Hct, External 38.4 2017   Platelet cnt., External 325 2017         Assessment and Plan:  Patient is s/p VAVD and PPH, did not receive PRBC. We reviewed CBC findings, risks of severe anemia. Would like to avoid transfusion. Will start iron for now. Hx of anemia, she was taking OTC iron during [facility]. Calling Dr Arie Thomas to set up iron infusion. Continue routine perineal care and maternal education.   Repeat CBC in AM  Jaye Elliott MD  2017  8:23 AM

## 2017-08-12 VITALS
RESPIRATION RATE: 17 BRPM | HEIGHT: 61 IN | HEART RATE: 107 BPM | BODY MASS INDEX: 32.47 KG/M2 | OXYGEN SATURATION: 99 % | SYSTOLIC BLOOD PRESSURE: 137 MMHG | DIASTOLIC BLOOD PRESSURE: 88 MMHG | WEIGHT: 172 LBS | TEMPERATURE: 98.3 F

## 2017-08-12 PROBLEM — O99.019 ANEMIA AFFECTING PREGNANCY, ANTEPARTUM: Chronic | Status: ACTIVE | Noted: 2017-08-12

## 2017-08-12 PROBLEM — O12.13 PROTEINURIA AFFECTING PREGNANCY IN THIRD TRIMESTER: Status: RESOLVED | Noted: 2017-07-28 | Resolved: 2017-08-12

## 2017-08-12 PROBLEM — O14.00 MILD PREECLAMPSIA: Status: RESOLVED | Noted: 2017-08-09 | Resolved: 2017-08-12

## 2017-08-12 LAB
ERYTHROCYTE [DISTWIDTH] IN BLOOD BY AUTOMATED COUNT: 15.6 % (ref 11.6–14.5)
HCT VFR BLD AUTO: 20.2 % (ref 35–45)
HCT VFR BLD AUTO: 28.4 % (ref 35–45)
HGB BLD-MCNC: 6.3 G/DL (ref 12–16)
HGB BLD-MCNC: 9.2 G/DL (ref 12–16)
MCH RBC QN AUTO: 28.8 PG (ref 24–34)
MCHC RBC AUTO-ENTMCNC: 31.2 G/DL (ref 31–37)
MCV RBC AUTO: 92.2 FL (ref 74–97)
PLATELET # BLD AUTO: 164 K/UL (ref 135–420)
PMV BLD AUTO: 9.7 FL (ref 9.2–11.8)
RBC # BLD AUTO: 2.19 M/UL (ref 4.2–5.3)
WBC # BLD AUTO: 10.6 K/UL (ref 4.6–13.2)

## 2017-08-12 PROCEDURE — 36430 TRANSFUSION BLD/BLD COMPNT: CPT

## 2017-08-12 PROCEDURE — 36415 COLL VENOUS BLD VENIPUNCTURE: CPT | Performed by: OBSTETRICS & GYNECOLOGY

## 2017-08-12 PROCEDURE — 74011250637 HC RX REV CODE- 250/637: Performed by: OBSTETRICS & GYNECOLOGY

## 2017-08-12 PROCEDURE — 85027 COMPLETE CBC AUTOMATED: CPT | Performed by: OBSTETRICS & GYNECOLOGY

## 2017-08-12 PROCEDURE — 77030020256 HC SOL INJ NACL 0.9%  500ML

## 2017-08-12 PROCEDURE — 30233N1 TRANSFUSION OF NONAUTOLOGOUS RED BLOOD CELLS INTO PERIPHERAL VEIN, PERCUTANEOUS APPROACH: ICD-10-PCS | Performed by: OBSTETRICS & GYNECOLOGY

## 2017-08-12 PROCEDURE — 85014 HEMATOCRIT: CPT | Performed by: OBSTETRICS & GYNECOLOGY

## 2017-08-12 PROCEDURE — P9016 RBC LEUKOCYTES REDUCED: HCPCS | Performed by: SPECIALIST

## 2017-08-12 PROCEDURE — 85018 HEMOGLOBIN: CPT | Performed by: OBSTETRICS & GYNECOLOGY

## 2017-08-12 RX ORDER — ACETAMINOPHEN 500 MG
1000 TABLET ORAL ONCE
Status: CANCELLED | OUTPATIENT
Start: 2017-08-12 | End: 2017-08-12

## 2017-08-12 RX ORDER — DIPHENHYDRAMINE HCL 25 MG
25 CAPSULE ORAL
Status: DISCONTINUED | OUTPATIENT
Start: 2017-08-12 | End: 2017-08-12 | Stop reason: HOSPADM

## 2017-08-12 RX ORDER — SODIUM CHLORIDE 9 MG/ML
250 INJECTION, SOLUTION INTRAVENOUS AS NEEDED
Status: DISCONTINUED | OUTPATIENT
Start: 2017-08-12 | End: 2017-08-12 | Stop reason: HOSPADM

## 2017-08-12 RX ADMIN — IBUPROFEN 800 MG: 400 TABLET, FILM COATED ORAL at 16:34

## 2017-08-12 RX ADMIN — IBUPROFEN 800 MG: 400 TABLET, FILM COATED ORAL at 01:23

## 2017-08-12 RX ADMIN — DIPHENHYDRAMINE HYDROCHLORIDE 25 MG: 25 CAPSULE ORAL at 09:33

## 2017-08-12 RX ADMIN — ACETAMINOPHEN 650 MG: 325 TABLET, FILM COATED ORAL at 09:33

## 2017-08-12 NOTE — ROUTINE PROCESS
Discharge instructions reviewed with patient, understanding verbalized of all instructions given. Time given for questions, all questions answered. Electronic signature unobtained, equipment unavailable. 1911Mom and baby wheeled to front entrance in stable condition.

## 2017-08-12 NOTE — DISCHARGE SUMMARY
Obstetrical Discharge Summary             Patient ID:  Jarrod Palmer  [id number]  60 y.o.  1995    Admit Date: 2017    Discharge Date: 2017     Admitting Physician: Shell Carvalho MD     Admission Diagnoses: maternity  Mild preeclampsia, third trimester  Labor and delivery, indication for care    Discharge Diagnoses: same as above      Additional Diagnoses:Present on Admission:   Anemia affecting pregnancy, antepartum            Baby link  Information for the patient's :  Nishant Chávez [[id number]]     Delivery Type: Vaginal, Vacuum (Extractor)   Delivery Date: 8/10/2017   Delivery Time: 10:00 AM     Birth Weight: 3.04 kg     Sex:  female  Delivery Clinician:  Luisa TAN   Gestational Age: Gestational Age: 43w4d    Presentation: Vertex   Position:    Occiput  Anterior     Apgars were 3  and 8      Resuscitation Method: PPV;Suctioning-deep; Tactile Stimulation;C-PAP     Meconium Stained: None  Living Status: Living       Placenta Date/Time: 8/10 10:05 AM   Placenta Removal: Spontaneous   Placenta Appearance: Vertex [1]     Cord Information: 3 Vessels    Cord Events: None       Cord Blood Sent?:  Yes    Blood Gases Sent?:  Yes         Baby procedures:   Information for the patient's :  Nishant Chávez [[id number]]          Feeding Method: Infant Feeding: Formula    Immunizations:    Immunization History   Administered Date(s) Administered    DTaP 2017       Group Beta Strep: GrBStrep, External   Date Value Ref Range Status   2017 positive  Final        WBC   Date/Time Value Ref Range Status   2017 06:20 AM 10.6 4.6 - 13.2 K/uL Final   2017 02:30 PM 10.4 4.6 - 13.2 K/uL Final   2017 06:00 PM 14.9 (H) 4.6 - 13.2 K/uL Final     HGB   Date/Time Value Ref Range Status   2017 05:25 PM 9.2 (L) 12.0 - 16.0 g/dL Final     Comment:     FOLLOWING RESULTS VERIFIED BY REPETITION:   2017 06:20 AM 6.3 (L) 12.0 - 16.0 g/dL Final   2017 06:33 AM 6.7 (L) 12.0 - 16.0 g/dL Final     PLATELET   Date/Time Value Ref Range Status   2017 06:20  135 - 420 K/uL Final     Hgb, External   Date/Time Value Ref Range Status   2017 12.7  Final     Platelet cnt., External   Date/Time Value Ref Range Status   2017 325  Final       Hospital Course: Unremarkable    Patient is feeling well. Good pain control. Ambulating, voiding and eating well, no nausea or vomiting. On examination. Patient is in good general condition in no apparent distress    Patient Vitals for the past 8 hrs:   BP Temp Pulse Resp SpO2   17 1840 137/88 - - - -   17 1633 (!) 148/96 98.3 °F (36.8 °C) (!) 107 17 99 %   17 1451 (!) 153/97 98.6 °F (37 °C) (!) 102 16 99 %   17 1419 (!) 140/94 - (!) 111 - -   17 1351 (!) 137/91 - (!) 106 - -   17 1320 134/86 98.5 °F (36.9 °C) (!) 105 - 98 %   17 1315 138/86 - (!) 104 - -   17 1310 130/87 98.5 °F (36.9 °C) (!) 102 - -   17 1305 130/87 98.5 °F (36.9 °C) 99 - -   17 1231 138/90 - (!) 101 - -   17 1200 (!) 134/93 98.3 °F (36.8 °C) (!) 103 - -   17 1130 117/83 - 92 - -   17 1115 126/87 - 100 - -   17 1100 135/87 - (!) 106 - -       Temp (24hrs), Av.3 °F (36.8 °C), Min:98 °F (36.7 °C), Max:98.6 °F (37 °C)            Prenatal Labs:   Lab Results   Component Value Date/Time    Rubella, External immune 2017    GrBStrep, External positive 2017    HBsAg, External neg 2017    HIV, External neg 2017    RPR, External neg 2017    Gonorrhea, External neg 2017    Chlamydia, External neg 2017         Per the nursing assessment I am confirming:  Chest is clear to auscultation. Abdomen soft not tender. Bowel sounds are normal  Legs are normal without tenderness, swelling, or redness    Impression: OK for her to be discharged. Plan:   Discharge today with instructions for activity, medications, and what to call for. Follow-up Appointments   Procedures    FOLLOW UP VISIT Appointment in: Other (Specify) 8/14 as scheduled     8/14 as scheduled     Standing Status:   Standing     Number of Occurrences:   1     Order Specific Question:   Appointment in     Answer:    Other (Specify)         Signed By: Bassam Merida MD     August 12, 2017

## 2017-08-12 NOTE — PROGRESS NOTES
PPD # 2    Patient doing well post-partum without significant complaint. Voiding without difficulty, normal lochia. Bottle feeding well. Baby stable. Vitals:  Patient Vitals for the past 8 hrs:   BP Temp Pulse Resp SpO2   17 0803 137/84 98.4 °F (36.9 °C) 99 16 100 %     Temp (24hrs), Av.2 °F (36.8 °C), Min:98.1 °F (36.7 °C), Max:98.4 °F (36.9 °C)      Vital signs stable, afebrile. Exam:  Patient without distress. Breasts intact and nontender               Abdomen soft, fundus firm at level of umbilicus, nontender               Perineum with normal lochia noted. Lower extremities are negative for swelling, cords or tenderness. Lab/Data Review:  CBC:   Lab Results   Component Value Date/Time    WBC 10.6 2017 06:20 AM    HGB 6.3 (L) 2017 06:20 AM    HCT 20.2 (L) 2017 06:20 AM     2017 06:20 AM     Lab Results  Component Value Date/Time   WBC 10.6 2017 06:20 AM   HGB 6.3 2017 06:20 AM   HCT 20.2 2017 06:20 AM   PLATELET 051  06:20 AM   MCV 92.2 2017 06:20 AM   Hgb, External 12.7 2017   Hct, External 38.4 2017   Platelet cnt., External 325 2017         Assessment and Plan: We repeated hgb today and will keep the plan if did not improve , Patient has consents signed and 2 PRBC ordered. She states all questions have been answered . Will repeat H&H 2h after transfusion completed . Continue routine perineal care and maternal education.   Syeda Sam MD  2017  9:24 AM

## 2017-08-12 NOTE — PROGRESS NOTES
PPD # 2    Patient doing well post-partum without significant complaint. S/p 2 U PRBC, feeling better . Voiding without difficulty, normal lochia. . No HA, no visual changes, no RUQ pain    Vitals:  Patient Vitals for the past 8 hrs:   BP Temp Pulse Resp SpO2   17 1840 137/88 - - - -   17 1633 (!) 148/96 98.3 °F (36.8 °C) (!) 107 17 99 %   17 1451 (!) 153/97 98.6 °F (37 °C) (!) 102 16 99 %   17 1419 (!) 140/94 - (!) 111 - -   17 1351 (!) 137/91 - (!) 106 - -   17 1320 134/86 98.5 °F (36.9 °C) (!) 105 - 98 %   17 1315 138/86 - (!) 104 - -   17 1310 130/87 98.5 °F (36.9 °C) (!) 102 - -   17 1305 130/87 98.5 °F (36.9 °C) 99 - -   17 1231 138/90 - (!) 101 - -   17 1200 (!) 134/93 98.3 °F (36.8 °C) (!) 103 - -   17 1130 117/83 - 92 - -   17 1115 126/87 - 100 - -   17 1100 135/87 - (!) 106 - -     Temp (24hrs), Av.3 °F (36.8 °C), Min:98 °F (36.7 °C), Max:98.6 °F (37 °C)      Vital signs stable, afebrile. Exam: unchanged     Lab/Data Review: All lab results for the last 24 hours reviewed. Lab Results  Component Value Date/Time   WBC 10.6 2017 06:20 AM   HGB 9.2 2017 05:25 PM   HCT 28.4 2017 05:25 PM   PLATELET 207 51/10/5834 06:20 AM   MCV 92.2 2017 06:20 AM   Hgb, External 12.7 2017   Hct, External 38.4 2017   Platelet cnt., External 325 2017         Assessment and Plan:   Plan discharge today and visit with Dr Issa Wick on .   Joel Mansfield MD  2017  6:45 PM

## 2017-08-12 NOTE — ROUTINE PROCESS
Bedside shift change report given to samuel Kerr rn (oncoming nurse) by Sarah Kenney rn(offgoing nurse). Report included the following information Kardex, Procedure Summary, Intake/Output, MAR and Recent Results.

## 2017-08-13 LAB
ABO + RH BLD: NORMAL
BLD PROD TYP BPU: NORMAL
BLD PROD TYP BPU: NORMAL
BLOOD GROUP ANTIBODIES SERPL: NORMAL
BPU ID: NORMAL
BPU ID: NORMAL
CALLED TO:,BCALL1: NORMAL
CROSSMATCH RESULT,%XM: NORMAL
CROSSMATCH RESULT,%XM: NORMAL
SPECIMEN EXP DATE BLD: NORMAL
STATUS OF UNIT,%ST: NORMAL
STATUS OF UNIT,%ST: NORMAL
UNIT DIVISION, %UDIV: 0
UNIT DIVISION, %UDIV: 0

## 2019-02-09 NOTE — PROGRESS NOTES
Chief Complaint   Patient presents with   • Wound Infection     Subjective   Maia Rodriguez is a 6 y.o. female who presents to the clinic today.  She is accompanied by her mother and father.  Her mother states she has had problems on and off for the past few months with her ear piercing sites getting infected.       Wound Infection   This is a new problem. The current episode started more than 1 month ago. The problem occurs intermittently. The problem has been waxing and waning. Pertinent negatives include no abdominal pain, anorexia, arthralgias, change in bowel habit, chest pain, chills, congestion, coughing, diaphoresis, fatigue, fever, headaches, joint swelling, myalgias, nausea, neck pain, numbness, rash, sore throat, swollen glands, urinary symptoms, vertigo, visual change, vomiting or weakness. Nothing aggravates the symptoms. She has tried nothing for the symptoms.         Current Outpatient Medications:   •  fluticasone (FLONASE) 50 MCG/ACT nasal spray, 1 SPRAY EACH NOSTRIL ONCE OR TWICE DAILY, Disp: , Rfl: 3  •  montelukast (SINGULAIR) 5 MG chewable tablet, GIVE ONE TABLET BY MOUTH AT BEDTIME AS NEEDED, Disp: , Rfl: 1  •  VENTOLIN  (90 Base) MCG/ACT inhaler, USE 2 PUFFS BY MOUTH EVER 4-6 HOURS AS NEEDED FOR COUGH,WHEEZING OR SHORTNESS OF BREATH, Disp: , Rfl: 1      Allergies:  Patient has no known allergies.    Past Medical History:   Diagnosis Date   • Allergic    • Asthma      History reviewed. No pertinent surgical history.  History reviewed. No pertinent family history.  Social History     Tobacco Use   • Smoking status: Never Smoker   • Smokeless tobacco: Never Used   Substance Use Topics   • Alcohol use: Not on file   • Drug use: Not on file       Review of Systems  Review of Systems   Constitutional: Negative for chills, diaphoresis, fatigue and fever.   HENT: Positive for ear discharge (from right earlobe ) and ear pain (bilateral earlobe tenderness). Negative for congestion, facial  Pt very uncomfortable. IV pain medication given X2. SVE 4-5/80/-1.  Ok for epidural per Heidi So "swelling, hearing loss and sore throat.    Respiratory: Negative for cough.    Cardiovascular: Negative for chest pain.   Gastrointestinal: Negative for abdominal pain, anorexia, change in bowel habit, nausea and vomiting.   Musculoskeletal: Negative for arthralgias, joint swelling, myalgias and neck pain.   Skin: Negative for rash.   Neurological: Negative for vertigo, weakness, numbness and headaches.       Objective   Pulse 83   Temp 98.7 °F (37.1 °C) (Oral)   Resp 18   Ht 135.9 cm (53.5\")   Wt 40.8 kg (90 lb)   SpO2 99%   BMI 22.11 kg/m²       Physical Exam   Constitutional: She appears well-developed and well-nourished. She is active.  Non-toxic appearance. She does not have a sickly appearance. She does not appear ill. No distress.   HENT:   Head: Normocephalic and atraumatic.   Right Ear: Tympanic membrane and canal normal.   Left Ear: Tympanic membrane and canal normal.   Right earlobe slightly red and swollen with yellow, crusted drainage around ear piercing site.  Left earlobe has no drainage, but slightly reddened and tender.     Neck: No neck adenopathy.   Cardiovascular: Normal rate, regular rhythm, S1 normal and S2 normal.   Pulmonary/Chest: Effort normal and breath sounds normal. There is normal air entry.   Neurological: She is alert and oriented for age.   Skin: Skin is warm and dry.   Vitals reviewed.      Assessment/Plan     Maia was seen today for wound infection.    Diagnoses and all orders for this visit:    Pierced ear infection, right, initial encounter    Other orders  -     cefdinir (OMNICEF) 250 MG/5ML suspension; 5.5 ml PO BID x 10 days    Take full course of antibiotic.  Take earrings out and leave out until better.  Wash earlobes/piercing site with mild soap and water daily.  Please follow up with your pediatrician if no better in 48-72 hours, or sooner if fever, severe swelling, severe/spreading redness, or severe pain develops.  Mother voiced understanding and in agreement with " plan.

## 2022-03-19 PROBLEM — O99.019 ANEMIA AFFECTING PREGNANCY, ANTEPARTUM: Status: ACTIVE | Noted: 2017-08-12

## 2023-01-31 RX ORDER — FERROUS SULFATE 325(65) MG
325 TABLET ORAL DAILY
COMMUNITY

## 2023-05-22 ENCOUNTER — OFFICE VISIT (OUTPATIENT)
Dept: URGENT CARE | Facility: PHYSICIAN GROUP | Age: 28
End: 2023-05-22
Payer: OTHER GOVERNMENT

## 2023-05-22 VITALS
RESPIRATION RATE: 14 BRPM | BODY MASS INDEX: 27.38 KG/M2 | WEIGHT: 145 LBS | OXYGEN SATURATION: 97 % | SYSTOLIC BLOOD PRESSURE: 118 MMHG | TEMPERATURE: 97 F | HEIGHT: 61 IN | DIASTOLIC BLOOD PRESSURE: 72 MMHG | HEART RATE: 86 BPM

## 2023-05-22 DIAGNOSIS — B86 SCABIES: ICD-10-CM

## 2023-05-22 PROCEDURE — 3078F DIAST BP <80 MM HG: CPT | Performed by: NURSE PRACTITIONER

## 2023-05-22 PROCEDURE — 3074F SYST BP LT 130 MM HG: CPT | Performed by: NURSE PRACTITIONER

## 2023-05-22 PROCEDURE — 99213 OFFICE O/P EST LOW 20 MIN: CPT | Performed by: NURSE PRACTITIONER

## 2023-05-22 RX ORDER — PERMETHRIN 50 MG/G
CREAM TOPICAL
Qty: 60 G | Refills: 3 | Status: SHIPPED | OUTPATIENT
Start: 2023-05-22

## 2023-05-22 RX ORDER — NORETHINDRONE ACETATE AND ETHINYL ESTRADIOL 1MG-20(21)
1 KIT ORAL DAILY
COMMUNITY
Start: 2023-05-08

## 2023-05-22 ASSESSMENT — ENCOUNTER SYMPTOMS
SORE THROAT: 0
DIARRHEA: 0
FEVER: 0
VOMITING: 0
NAUSEA: 0
ABDOMINAL PAIN: 0
CHILLS: 0
HEADACHES: 0

## 2023-05-22 NOTE — PROGRESS NOTES
Patient has consented to treatment and for use of patient information for treatment and billing purposes.    Chief Complaint:    Chief Complaint   Patient presents with    Rash     Since Jan comes and goes, always itchy, all over body, lower legs and under butt, raised , chest, arms and stomach         History of Present Illness: 28 y.o.  female presents to clinic with pruritic rash and lesions on abdomen, groin area, under buttock, between legs, at bra line, and her back.  She states that it started in January and it seems to improve slightly and then worsens again.  She denies any discharge or drainage, having any fevers, chills, or headache.    She has tried multiple over-the-counter anti-itch medications without any symptom relief.      Review of Systems   Constitutional:  Negative for chills and fever.   HENT:  Negative for sore throat.    Gastrointestinal:  Negative for abdominal pain, diarrhea, nausea and vomiting.   Neurological:  Negative for headaches.       Medications, Allergies, and current problem list reviewed today in Epic.    Physical Exam:    Vitals:    05/22/23 1557   BP: 118/72   Pulse: 86   Resp: 14   Temp: 36.1 °C (97 °F)   SpO2: 97%             Physical Exam  Constitutional:       General: She is not in acute distress.     Appearance: She is not ill-appearing or toxic-appearing.   HENT:      Head: Normocephalic.      Nose: Nose normal.      Mouth/Throat:      Mouth: Mucous membranes are moist.   Eyes:      Extraocular Movements: Extraocular movements intact.      Conjunctiva/sclera: Conjunctivae normal.      Pupils: Pupils are equal, round, and reactive to light.   Cardiovascular:      Rate and Rhythm: Normal rate.   Pulmonary:      Effort: Pulmonary effort is normal.   Musculoskeletal:         General: Normal range of motion.      Cervical back: Normal range of motion and neck supple.   Skin:     General: Skin is warm and dry.      Comments: Generalized tiny, raised, pruritic bumps with  erythematous papules and burrows tracks.  Several areas of scabbed over bumps due to scratching.  No discharge or drainage.   Neurological:      General: No focal deficit present.      Mental Status: She is alert and oriented to person, place, and time.   Psychiatric:         Mood and Affect: Mood normal.         Behavior: Behavior normal.            Medical Decision Making and Plan:  I personally reviewed prior external notes and test results pertinent to today's visit.   Shared decision-making was utilized with patient did develop treatment plan and clinic course.     Provided  with information on the etiology & pathogenesis of scabies. Advised the family to apply Permethrin Cream as instructed from head to toe this evening, leave on for 8-12 hours overnight & wash with water in the morning.   If the rash persists in 1 week or they note live mites, may repeat application of the cream at that time.   Instructed to wash all clothing, bed clothes, sheets with HOT water and place in the dryer on a high heat setting.   For any articles that cannot be washed it is recommended to place them in a seal proof plastic bag x 3 days (this includes mattresses).   Advised parents that scabies usually die if they are off human skin surface for >3d.   May use OTC antihistamine prn itching.   Advised patient that the remainder of the family should seek treatment as well. RTC if no improvement after attempt to eradicate with 2 applications of Permethrin cream or if patient develops excoriation, redness, drainage, swelling of rash, or fever >101.5--any s/sx infection.        Differential diagnosis, natural history, supportive care, and indications for immediate follow-up discussed.    The patient remained stable during the urgent care visit.      1. Scabies  - permethrin (ELIMITE) 5 % Cream; APPLY CREAM FROM HEAD (AVOID MOUTH/NOSE/EYES) TO SOLES OF FEET AND WASH AFTER 8-14 HOURS. REPEAT IN 1 WEEK IF NEEDED.  Dispense: 60 g; Refill:  3    Other orders  - BLISOVI FE 1/20 1-20 MG-MCG per tablet; Take 1 Tablet by mouth every day.      Patient was encouraged to monitor symptoms closely. Those signs and symptoms which would warrant concern and mandate seeking a higher level of service through the emergency department discussed at length.  Patient stated agreement and understanding of this plan of care.    Follow up:  Advised the patient to follow-up with the primary care physician for recheck, reevaluation, and consideration of further management.  Patient verbalized understanding of treatment plan and has no further questions regarding care.        Disposition:  Home in stable condition       Voice Recognition Disclaimer:  Portions of this document were created using voice recognition software. The software does have a chance of producing errors of grammar and possibly content. I have made every reasonable attempt to correct obvious errors, but there may be errors of grammar and possibly content that I did not discover before finalizing the documentation.

## 2023-05-22 NOTE — PATIENT INSTRUCTIONS
Apply Permethrin cream as instructed from head to toe this evening, leave on for 8-12 hours overnight and wash with water in the morning. If the rash persists in 1 week or you see live mites, you may repeat application of the cream at that time.     Wash all clothing, bed clothes, sheets with HOT water and place in the dryer on a high heat setting. For any articles that cannot be washed it is recommended to place them in a seal proof plastic bag for three days (this includes mattresses). Scabies usually die if they are off human skin surfaces for more than three days.    You may use over-the-counter anti-histamine cream as needed for itching. I recommend all family members seek treatment.    RTC if no improvement after two applications of Permethrin cream or if patient develops fever or worsening rash.

## 2023-06-08 ENCOUNTER — APPOINTMENT (RX ONLY)
Dept: URBAN - METROPOLITAN AREA CLINIC 4 | Facility: CLINIC | Age: 28
Setting detail: DERMATOLOGY
End: 2023-06-08

## 2023-06-08 DIAGNOSIS — L259 CONTACT DERMATITIS AND OTHER ECZEMA, UNSPECIFIED CAUSE: ICD-10-CM

## 2023-06-08 PROBLEM — L23.9 ALLERGIC CONTACT DERMATITIS, UNSPECIFIED CAUSE: Status: ACTIVE | Noted: 2023-06-08

## 2023-06-08 PROCEDURE — 99203 OFFICE O/P NEW LOW 30 MIN: CPT

## 2023-06-08 PROCEDURE — ? MEDICATION COUNSELING

## 2023-06-08 PROCEDURE — ? COUNSELING

## 2023-06-08 PROCEDURE — ? PRESCRIPTION

## 2023-06-08 PROCEDURE — ? ADDITIONAL NOTES

## 2023-06-08 RX ORDER — PREDNISONE 10 MG/1
TABLET ORAL
Qty: 42 | Refills: 0 | Status: ERX | COMMUNITY
Start: 2023-06-08

## 2023-06-08 RX ORDER — TRIAMCINOLONE ACETONIDE 1 MG/G
1 OINTMENT TOPICAL BID
Qty: 160 | Refills: 0 | Status: ERX | COMMUNITY
Start: 2023-06-08

## 2023-06-08 RX ADMIN — PREDNISONE: 10 TABLET ORAL at 00:00

## 2023-06-08 RX ADMIN — TRIAMCINOLONE ACETONIDE 1: 1 OINTMENT TOPICAL at 00:00

## 2023-06-08 ASSESSMENT — LOCATION DETAILED DESCRIPTION DERM
LOCATION DETAILED: EPIGASTRIC SKIN
LOCATION DETAILED: RIGHT ANTERIOR PROXIMAL THIGH
LOCATION DETAILED: RIGHT INFERIOR MEDIAL LOWER BACK
LOCATION DETAILED: RIGHT MEDIAL UPPER BACK
LOCATION DETAILED: LEFT ANTERIOR PROXIMAL THIGH

## 2023-06-08 ASSESSMENT — LOCATION SIMPLE DESCRIPTION DERM
LOCATION SIMPLE: ABDOMEN
LOCATION SIMPLE: RIGHT LOWER BACK
LOCATION SIMPLE: RIGHT UPPER BACK
LOCATION SIMPLE: LEFT THIGH
LOCATION SIMPLE: RIGHT THIGH

## 2023-06-08 ASSESSMENT — LOCATION ZONE DERM
LOCATION ZONE: LEG
LOCATION ZONE: TRUNK

## 2023-06-08 NOTE — PROCEDURE: ADDITIONAL NOTES
Detail Level: Simple
Additional Notes: Provided prednisone should the topical fail to provide resolve in 48 hrs; patient is otherwise healthy. Discussed if no resolve or recurrence then patch testing. Patient will contact me if this is the case.
Render Risk Assessment In Note?: no

## 2023-06-08 NOTE — PROCEDURE: MEDICATION COUNSELING
Tranexamic Acid Pregnancy And Lactation Text: It is unknown if this medication is safe during pregnancy or breast feeding.
Topical Retinoid counseling:  Patient advised to apply a pea-sized amount only at bedtime and wait 30 minutes after washing their face before applying.  If too drying, patient may add a non-comedogenic moisturizer. The patient verbalized understanding of the proper use and possible adverse effects of retinoids.  All of the patient's questions and concerns were addressed.
Oxybutynin Counseling:  I discussed with the patient the risks of oxybutynin including but not limited to skin rash, drowsiness, dry mouth, difficulty urinating, and blurred vision.
Odomzo Pregnancy And Lactation Text: This medication is Pregnancy Category X and is absolutely contraindicated during pregnancy. It is unknown if it is excreted in breast milk.
Protopic Pregnancy And Lactation Text: This medication is Pregnancy Category C. It is unknown if this medication is excreted in breast milk when applied topically.
Rifampin Pregnancy And Lactation Text: This medication is Pregnancy Category C and it isn't know if it is safe during pregnancy. It is also excreted in breast milk and should not be used if you are breast feeding.
Olumiant Pregnancy And Lactation Text: Based on animal studies, Olumiant may cause embryo-fetal harm when administered to pregnant women.  The medication should not be used in pregnancy.  Breastfeeding is not recommended during treatment.
Drysol Counseling:  I discussed with the patient the risks of drysol/aluminum chloride including but not limited to skin rash, itching, irritation, burning.
Glycopyrrolate Counseling:  I discussed with the patient the risks of glycopyrrolate including but not limited to skin rash, drowsiness, dry mouth, difficulty urinating, and blurred vision.
Vtama Pregnancy And Lactation Text: It is unknown if this medication can cause problems during pregnancy and breastfeeding.
Doxepin Pregnancy And Lactation Text: This medication is Pregnancy Category C and it isn't known if it is safe during pregnancy. It is also excreted in breast milk and breast feeding isn't recommended.
Azithromycin Pregnancy And Lactation Text: This medication is considered safe during pregnancy and is also secreted in breast milk.
Cimzia Counseling:  I discussed with the patient the risks of Cimzia including but not limited to immunosuppression, allergic reactions and infections.  The patient understands that monitoring is required including a PPD at baseline and must alert us or the primary physician if symptoms of infection or other concerning signs are noted.
Erythromycin Counseling:  I discussed with the patient the risks of erythromycin including but not limited to GI upset, allergic reaction, drug rash, diarrhea, increase in liver enzymes, and yeast infections.
Isotretinoin Counseling: Patient should get monthly blood tests, not donate blood, not drive at night if vision affected, not share medication, and not undergo elective surgery for 6 months after tx completed. Side effects reviewed, pt to contact office should one occur.
Minoxidil Counseling: Minoxidil is a topical medication which can increase blood flow where it is applied. It is uncertain how this medication increases hair growth. Side effects are uncommon and include stinging and allergic reactions.
Niacinamide Pregnancy And Lactation Text: These medications are considered safe during pregnancy.
Adbry Pregnancy And Lactation Text: It is unknown if this medication will adversely affect pregnancy or breast feeding.
Benzoyl Peroxide Counseling: Patient counseled that medicine may cause skin irritation and bleach clothing.  In the event of skin irritation, the patient was advised to reduce the amount of the drug applied or use it less frequently.   The patient verbalized understanding of the proper use and possible adverse effects of benzoyl peroxide.  All of the patient's questions and concerns were addressed.
Tremfya Counseling: I discussed with the patient the risks of guselkumab including but not limited to immunosuppression, serious infections, and drug reactions.  The patient understands that monitoring is required including a PPD at baseline and must alert us or the primary physician if symptoms of infection or other concerning signs are noted.
Topical Steroids Counseling: I discussed with the patient that prolonged use of topical steroids can result in the increased appearance of superficial blood vessels (telangiectasias), lightening (hypopigmentation) and thinning of the skin (atrophy).  Patient understands to avoid using high potency steroids in skin folds, the groin or the face.  The patient verbalized understanding of the proper use and possible adverse effects of topical steroids.  All of the patient's questions and concerns were addressed.
Itraconazole Pregnancy And Lactation Text: This medication is Pregnancy Category C and it isn't know if it is safe during pregnancy. It is also excreted in breast milk.
Spironolactone Pregnancy And Lactation Text: This medication can cause feminization of the male fetus and should be avoided during pregnancy. The active metabolite is also found in breast milk.
Klisyri Counseling:  I discussed with the patient the risks of Klisyri including but not limited to erythema, scaling, itching, weeping, crusting, and pain.
Siliq Pregnancy And Lactation Text: The risk during pregnancy and breastfeeding is uncertain with this medication.
Topical Retinoid Pregnancy And Lactation Text: This medication is Pregnancy Category C. It is unknown if this medication is excreted in breast milk.
Cyclosporine Pregnancy And Lactation Text: This medication is Pregnancy Category C and it isn't know if it is safe during pregnancy. This medication is excreted in breast milk.
Valtrex Counseling: I discussed with the patient the risks of valacyclovir including but not limited to kidney damage, nausea, vomiting and severe allergy.  The patient understands that if the infection seems to be worsening or is not improving, they are to call.
Ketoconazole Counseling:   Patient counseled regarding improving absorption with orange juice.  Adverse effects include but are not limited to breast enlargement, headache, diarrhea, nausea, upset stomach, liver function test abnormalities, taste disturbance, and stomach pain.  There is a rare possibility of liver failure that can occur when taking ketoconazole. The patient understands that monitoring of LFTs may be required, especially at baseline. The patient verbalized understanding of the proper use and possible adverse effects of ketoconazole.  All of the patient's questions and concerns were addressed.
Drysol Pregnancy And Lactation Text: This medication is considered safe during pregnancy and breast feeding.
Qbrexza Counseling:  I discussed with the patient the risks of Qbrexza including but not limited to headache, mydriasis, blurred vision, dry eyes, nasal dryness, dry mouth, dry throat, dry skin, urinary hesitation, and constipation.  Local skin reactions including erythema, burning, stinging, and itching can also occur.
Oxybutynin Pregnancy And Lactation Text: This medication is Pregnancy Category B and is considered safe during pregnancy. It is unknown if it is excreted in breast milk.
Bactrim Counseling:  I discussed with the patient the risks of sulfa antibiotics including but not limited to GI upset, allergic reaction, drug rash, diarrhea, dizziness, photosensitivity, and yeast infections.  Rarely, more serious reactions can occur including but not limited to aplastic anemia, agranulocytosis, methemoglobinemia, blood dyscrasias, liver or kidney failure, lung infiltrates or desquamative/blistering drug rashes.
Rinvoq Counseling: I discussed with the patient the risks of Rinvoq therapy including but not limited to upper respiratory tract infections, shingles, cold sores, bronchitis, nausea, cough, fever, acne, and headache. Live vaccines should be avoided.  This medication has been linked to serious infections; higher rate of mortality; malignancy and lymphoproliferative disorders; major adverse cardiovascular events; thrombosis; thrombocytopenia, anemia, and neutropenia; lipid elevations; liver enzyme elevations; and gastrointestinal perforations.
Erythromycin Pregnancy And Lactation Text: This medication is Pregnancy Category B and is considered safe during pregnancy. It is also excreted in breast milk.
Glycopyrrolate Pregnancy And Lactation Text: This medication is Pregnancy Category B and is considered safe during pregnancy. It is unknown if it is excreted breast milk.
Sarecycline Counseling: Patient advised regarding possible photosensitivity and discoloration of the teeth, skin, lips, tongue and gums.  Patient instructed to avoid sunlight, if possible.  When exposed to sunlight, patients should wear protective clothing, sunglasses, and sunscreen.  The patient was instructed to call the office immediately if the following severe adverse effects occur:  hearing changes, easy bruising/bleeding, severe headache, or vision changes.  The patient verbalized understanding of the proper use and possible adverse effects of sarecycline.  All of the patient's questions and concerns were addressed.
Hydroxyzine Counseling: Patient advised that the medication is sedating and not to drive a car after taking this medication.  Patient informed of potential adverse effects including but not limited to dry mouth, urinary retention, and blurry vision.  The patient verbalized understanding of the proper use and possible adverse effects of hydroxyzine.  All of the patient's questions and concerns were addressed.
Nsaids Counseling: NSAID Counseling: I discussed with the patient that NSAIDs should be taken with food. Prolonged use of NSAIDs can result in the development of stomach ulcers.  Patient advised to stop taking NSAIDs if abdominal pain occurs.  The patient verbalized understanding of the proper use and possible adverse effects of NSAIDs.  All of the patient's questions and concerns were addressed.
Benzoyl Peroxide Pregnancy And Lactation Text: This medication is Pregnancy Category C. It is unknown if benzoyl peroxide is excreted in breast milk.
Minoxidil Pregnancy And Lactation Text: This medication has not been assigned a Pregnancy Risk Category but animal studies failed to show danger with the topical medication. It is unknown if the medication is excreted in breast milk.
Cimzia Pregnancy And Lactation Text: This medication crosses the placenta but can be considered safe in certain situations. Cimzia may be excreted in breast milk.
Humira Pregnancy And Lactation Text: This medication is Pregnancy Category B and is considered safe during pregnancy. It is unknown if this medication is excreted in breast milk.
Clofazimine Counseling:  I discussed with the patient the risks of clofazimine including but not limited to skin and eye pigmentation, liver damage, nausea/vomiting, gastrointestinal bleeding and allergy.
Hydroxyzine Pregnancy And Lactation Text: This medication is not safe during pregnancy and should not be taken. It is also excreted in breast milk and breast feeding isn't recommended.
Topical Steroids Applications Pregnancy And Lactation Text: Most topical steroids are considered safe to use during pregnancy and lactation.  Any topical steroid applied to the breast or nipple should be washed off before breastfeeding.
Zoryve Counseling:  I discussed with the patient that Zoryve is not for use in the eyes, mouth or vagina. The most commonly reported side effects include diarrhea, headache, insomnia, application site pain, upper respiratory tract infections, and urinary tract infections.  All of the patient's questions and concerns were addressed.
Methotrexate Counseling:  Patient counseled regarding adverse effects of methotrexate including but not limited to nausea, vomiting, abnormalities in liver function tests. Patients may develop mouth sores, rash, diarrhea, and abnormalities in blood counts. The patient understands that monitoring is required including LFT's and blood counts.  There is a rare possibility of scarring of the liver and lung problems that can occur when taking methotrexate. Persistent nausea, loss of appetite, pale stools, dark urine, cough, and shortness of breath should be reported immediately. Patient advised to discontinue methotrexate treatment at least three months before attempting to become pregnant.  I discussed the need for folate supplements while taking methotrexate.  These supplements can decrease side effects during methotrexate treatment. The patient verbalized understanding of the proper use and possible adverse effects of methotrexate.  All of the patient's questions and concerns were addressed.
Valtrex Pregnancy And Lactation Text: this medication is Pregnancy Category B and is considered safe during pregnancy. This medication is not directly found in breast milk but it's metabolite acyclovir is present.
Isotretinoin Pregnancy And Lactation Text: This medication is Pregnancy Category X and is considered extremely dangerous during pregnancy. It is unknown if it is excreted in breast milk.
Simponi Counseling:  I discussed with the patient the risks of golimumab including but not limited to myelosuppression, immunosuppression, autoimmune hepatitis, demyelinating diseases, lymphoma, and serious infections.  The patient understands that monitoring is required including a PPD at baseline and must alert us or the primary physician if symptoms of infection or other concerning signs are noted.
Tazorac Counseling:  Patient advised that medication is irritating and drying.  Patient may need to apply sparingly and wash off after an hour before eventually leaving it on overnight.  The patient verbalized understanding of the proper use and possible adverse effects of tazorac.  All of the patient's questions and concerns were addressed.
Propranolol Counseling:  I discussed with the patient the risks of propranolol including but not limited to low heart rate, low blood pressure, low blood sugar, restlessness and increased cold sensitivity. They should call the office if they experience any of these side effects.
Elidel Counseling: Patient may experience a mild burning sensation during topical application. Elidel is not approved in children less than 2 years of age. There have been case reports of hematologic and skin malignancies in patients using topical calcineurin inhibitors although causality is questionable.
Metronidazole Counseling:  I discussed with the patient the risks of metronidazole including but not limited to seizures, nausea/vomiting, a metallic taste in the mouth, nausea/vomiting and severe allergy.
Tetracycline Pregnancy And Lactation Text: This medication is Pregnancy Category D and not consider safe during pregnancy. It is also excreted in breast milk.
Bactrim Pregnancy And Lactation Text: This medication is Pregnancy Category D and is known to cause fetal risk.  It is also excreted in breast milk.
Qbrexza Pregnancy And Lactation Text: There is no available data on Qbrexza use in pregnant women.  There is no available data on Qbrexza use in lactation.
Hydroxychloroquine Counseling:  I discussed with the patient that a baseline ophthalmologic exam is needed at the start of therapy and every year thereafter while on therapy. A CBC may also be warranted for monitoring.  The side effects of this medication were discussed with the patient, including but not limited to agranulocytosis, aplastic anemia, seizures, rashes, retinopathy, and liver toxicity. Patient instructed to call the office should any adverse effect occur.  The patient verbalized understanding of the proper use and possible adverse effects of Plaquenil.  All the patient's questions and concerns were addressed.
Ketoconazole Pregnancy And Lactation Text: This medication is Pregnancy Category C and it isn't know if it is safe during pregnancy. It is also excreted in breast milk and breast feeding isn't recommended.
Nsaids Pregnancy And Lactation Text: These medications are considered safe up to 30 weeks gestation. It is excreted in breast milk.
Rinvoq Pregnancy And Lactation Text: Based on animal studies, Rinvoq may cause embryo-fetal harm when administered to pregnant women.  The medication should not be used in pregnancy.  Breastfeeding is not recommended during treatment and for 6 days after the last dose.
Mirvaso Counseling: Mirvaso is a topical medication which can decrease superficial blood flow where applied. Side effects are uncommon and include stinging, redness and allergic reactions.
Cosentyx Counseling:  I discussed with the patient the risks of Cosentyx including but not limited to worsening of Crohn's disease, immunosuppression, allergic reactions and infections.  The patient understands that monitoring is required including a PPD at baseline and must alert us or the primary physician if symptoms of infection or other concerning signs are noted.
Azathioprine Counseling:  I discussed with the patient the risks of azathioprine including but not limited to myelosuppression, immunosuppression, hepatotoxicity, lymphoma, and infections.  The patient understands that monitoring is required including baseline LFTs, Creatinine, possible TPMP genotyping and weekly CBCs for the first month and then every 2 weeks thereafter.  The patient verbalized understanding of the proper use and possible adverse effects of azathioprine.  All of the patient's questions and concerns were addressed.
Clofazimine Pregnancy And Lactation Text: This medication is Pregnancy Category C and isn't considered safe during pregnancy. It is excreted in breast milk.
High Dose Vitamin A Counseling: Side effects reviewed, pt to contact office should one occur.
Topical Sulfur Applications Counseling: Topical Sulfur Counseling: Patient counseled that this medication may cause skin irritation or allergic reactions.  In the event of skin irritation, the patient was advised to reduce the amount of the drug applied or use it less frequently.   The patient verbalized understanding of the proper use and possible adverse effects of topical sulfur application.  All of the patient's questions and concerns were addressed.
Xolair Pregnancy And Lactation Text: This medication is Pregnancy Category B and is considered safe during pregnancy. This medication is excreted in breast milk.
Tazorac Pregnancy And Lactation Text: This medication is not safe during pregnancy. It is unknown if this medication is excreted in breast milk.
Carac Counseling:  I discussed with the patient the risks of Carac including but not limited to erythema, scaling, itching, weeping, crusting, and pain.
Methotrexate Pregnancy And Lactation Text: This medication is Pregnancy Category X and is known to cause fetal harm. This medication is excreted in breast milk.
Use Enhanced Medication Counseling?: No
Dutasteride Male Counseling: Dustasteride Counseling:  I discussed with the patient the risks of use of dutasteride including but not limited to decreased libido, decreased ejaculate volume, and gynecomastia. Women who can become pregnant should not handle medication.  All of the patient's questions and concerns were addressed.
Xolair Counseling:  Patient informed of potential adverse effects including but not limited to fever, muscle aches, rash and allergic reactions.  The patient verbalized understanding of the proper use and possible adverse effects of Xolair.  All of the patient's questions and concerns were addressed.
Ilumya Counseling: I discussed with the patient the risks of tildrakizumab including but not limited to immunosuppression, malignancy, posterior leukoencephalopathy syndrome, and serious infections.  The patient understands that monitoring is required including a PPD at baseline and must alert us or the primary physician if symptoms of infection or other concerning signs are noted.
Rhofade Counseling: Rhofade is a topical medication which can decrease superficial blood flow where applied. Side effects are uncommon and include stinging, redness and allergic reactions.
Sotyktu Counseling:  I discussed the most common side effects of Sotyktu including: common cold, sore throat, sinus infections, cold sores, canker sores, folliculitis, and acne.  I also discussed more serious side effects of Sotyktu including but not limited to: serious allergic reactions; increased risk for infections such as TB; cancers such as lymphomas; rhabdomyolysis and elevated CPK; and elevated triglycerides and liver enzymes. 
Propranolol Pregnancy And Lactation Text: This medication is Pregnancy Category C and it isn't known if it is safe during pregnancy. It is excreted in breast milk.
Cephalexin Counseling: I counseled the patient regarding use of cephalexin as an antibiotic for prophylactic and/or therapeutic purposes. Cephalexin (commonly prescribed under brand name Keflex) is a cephalosporin antibiotic which is active against numerous classes of bacteria, including most skin bacteria. Side effects may include nausea, diarrhea, gastrointestinal upset, rash, hives, yeast infections, and in rare cases, hepatitis, kidney disease, seizures, fever, confusion, neurologic symptoms, and others. Patients with severe allergies to penicillin medications are cautioned that there is about a 10% incidence of cross-reactivity with cephalosporins. When possible, patients with penicillin allergies should use alternatives to cephalosporins for antibiotic therapy.
Azathioprine Pregnancy And Lactation Text: This medication is Pregnancy Category D and isn't considered safe during pregnancy. It is unknown if this medication is excreted in breast milk.
Hydroxychloroquine Pregnancy And Lactation Text: This medication has been shown to cause fetal harm but it isn't assigned a Pregnancy Risk Category. There are small amounts excreted in breast milk.
Mirvaso Pregnancy And Lactation Text: This medication has not been assigned a Pregnancy Risk Category. It is unknown if the medication is excreted in breast milk.
Dutasteride Pregnancy And Lactation Text: This medication is absolutely contraindicated in women, especially during pregnancy and breast feeding. Feminization of male fetuses is possible if taking while pregnant.
Olanzapine Counseling- I discussed with the patient the common side effects of olanzapine including but are not limited to: lack of energy, dry mouth, increased appetite, sleepiness, tremor, constipation, dizziness, changes in behavior, or restlessness.  Explained that teenagers are more likely to experience headaches, abdominal pain, pain in the arms or legs, tiredness, and sleepiness.  Serious side effects include but are not limited: increased risk of death in elderly patients who are confused, have memory loss, or dementia-related psychosis; hyperglycemia; increased cholesterol and triglycerides; and weight gain.
Colchicine Counseling:  Patient counseled regarding adverse effects including but not limited to stomach upset (nausea, vomiting, stomach pain, or diarrhea).  Patient instructed to limit alcohol consumption while taking this medication.  Colchicine may reduce blood counts especially with prolonged use.  The patient understands that monitoring of kidney function and blood counts may be required, especially at baseline. The patient verbalized understanding of the proper use and possible adverse effects of colchicine.  All of the patient's questions and concerns were addressed.
Terbinafine Counseling: Patient counseling regarding adverse effects of terbinafine including but not limited to headache, diarrhea, rash, upset stomach, liver function test abnormalities, itching, taste/smell disturbance, nausea, abdominal pain, and flatulence.  There is a rare possibility of liver failure that can occur when taking terbinafine.  The patient understands that a baseline LFT and kidney function test may be required. The patient verbalized understanding of the proper use and possible adverse effects of terbinafine.  All of the patient's questions and concerns were addressed.
Topical Sulfur Applications Pregnancy And Lactation Text: This medication is considered safe during pregnancy and breast feeding secondary to limited systemic absorption.
Albendazole Counseling:  I discussed with the patient the risks of albendazole including but not limited to cytopenia, kidney damage, nausea/vomiting and severe allergy.  The patient understands that this medication is being used in an off-label manner.
High Dose Vitamin A Pregnancy And Lactation Text: High dose vitamin A therapy is contraindicated during pregnancy and breast feeding.
Infliximab Counseling:  I discussed with the patient the risks of infliximab including but not limited to myelosuppression, immunosuppression, autoimmune hepatitis, demyelinating diseases, lymphoma, and serious infections.  The patient understands that monitoring is required including a PPD at baseline and must alert us or the primary physician if symptoms of infection or other concerning signs are noted.
Topical Clindamycin Counseling: Patient counseled that this medication may cause skin irritation or allergic reactions.  In the event of skin irritation, the patient was advised to reduce the amount of the drug applied or use it less frequently.   The patient verbalized understanding of the proper use and possible adverse effects of clindamycin.  All of the patient's questions and concerns were addressed.
Zyclara Counseling:  I discussed with the patient the risks of imiquimod including but not limited to erythema, scaling, itching, weeping, crusting, and pain.  Patient understands that the inflammatory response to imiquimod is variable from person to person and was educated regarded proper titration schedule.  If flu-like symptoms develop, patient knows to discontinue the medication and contact us.
Eucrisa Counseling: Patient may experience a mild burning sensation during topical application. Eucrisa is not approved in children less than 3 months of age.
Skyrizi Counseling: I discussed with the patient the risks of risankizumab-rzaa including but not limited to immunosuppression, and serious infections.  The patient understands that monitoring is required including a PPD at baseline and must alert us or the primary physician if symptoms of infection or other concerning signs are noted.
Prednisone Counseling:  I discussed with the patient the risks of prolonged use of prednisone including but not limited to weight gain, insomnia, osteoporosis, mood changes, diabetes, susceptibility to infection, glaucoma and high blood pressure.  In cases where prednisone use is prolonged, patients should be monitored with blood pressure checks, serum glucose levels and an eye exam.  Additionally, the patient may need to be placed on GI prophylaxis, PCP prophylaxis, and calcium and vitamin D supplementation and/or a bisphosphonate.  The patient verbalized understanding of the proper use and the possible adverse effects of prednisone.  All of the patient's questions and concerns were addressed.
Carac Pregnancy And Lactation Text: This medication is Pregnancy Category X and contraindicated in pregnancy and in women who may become pregnant. It is unknown if this medication is excreted in breast milk.
SSKI Counseling:  I discussed with the patient the risks of SSKI including but not limited to thyroid abnormalities, metallic taste, GI upset, fever, headache, acne, arthralgias, paraesthesias, lymphadenopathy, easy bleeding, arrhythmias, and allergic reaction.
Low Dose Naltrexone Counseling- I discussed with the patient the potential risks and side effects of low dose naltrexone including but not limited to: more vivid dreams, headaches, nausea, vomiting, abdominal pain, fatigue, dizziness, and anxiety.
Tetracycline Counseling: Patient counseled regarding possible photosensitivity and increased risk for sunburn.  Patient instructed to avoid sunlight, if possible.  When exposed to sunlight, patients should wear protective clothing, sunglasses, and sunscreen.  The patient was instructed to call the office immediately if the following severe adverse effects occur:  hearing changes, easy bruising/bleeding, severe headache, or vision changes.  The patient verbalized understanding of the proper use and possible adverse effects of tetracycline.  All of the patient's questions and concerns were addressed. Patient understands to avoid pregnancy while on therapy due to potential birth defects.
Opzelura Counseling:  I discussed with the patient the risks of Opzelura including but not limited to nasopharngitis, bronchitis, ear infection, eosinophila, hives, diarrhea, folliculitis, tonsillitis, and rhinorrhea.  Taken orally, this medication has been linked to serious infections; higher rate of mortality; malignancy and lymphoproliferative disorders; major adverse cardiovascular events; thrombosis; thrombocytopenia, anemia, and neutropenia; and lipid elevations.
Finasteride Male Counseling: Finasteride Counseling:  I discussed with the patient the risks of use of finasteride including but not limited to decreased libido, decreased ejaculate volume, gynecomastia, and depression. Women should not handle medication.  All of the patient's questions and concerns were addressed.
Olanzapine Pregnancy And Lactation Text: This medication is pregnancy category C.   There are no adequate and well controlled trials with olanzapine in pregnant females.  Olanzapine should be used during pregnancy only if the potential benefit justifies the potential risk to the fetus.   In a study in lactating healthy women, olanzapine was excreted in breast milk.  It is recommended that women taking olanzapine should not breast feed.
Metronidazole Pregnancy And Lactation Text: This medication is Pregnancy Category B and considered safe during pregnancy.  It is also excreted in breast milk.
Cellcept Counseling:  I discussed with the patient the risks of mycophenolate mofetil including but not limited to infection/immunosuppression, GI upset, hypokalemia, hypercholesterolemia, bone marrow suppression, lymphoproliferative disorders, malignancy, GI ulceration/bleed/perforation, colitis, interstitial lung disease, kidney failure, progressive multifocal leukoencephalopathy, and birth defects.  The patient understands that monitoring is required including a baseline creatinine and regular CBC testing. In addition, patient must alert us immediately if symptoms of infection or other concerning signs are noted.
Sotyktu Pregnancy And Lactation Text: There is insufficient data to evaluate whether or not Sotyktu is safe to use during pregnancy.   It is not known if Sotyktu passes into breast milk and whether or not it is safe to use when breastfeeding.  
Wartpeel Counseling:  I discussed with the patient the risks of Wartpeel including but not limited to erythema, scaling, itching, weeping, crusting, and pain.
Stelara Counseling:  I discussed with the patient the risks of ustekinumab including but not limited to immunosuppression, malignancy, posterior leukoencephalopathy syndrome, and serious infections.  The patient understands that monitoring is required including a PPD at baseline and must alert us or the primary physician if symptoms of infection or other concerning signs are noted.
Albendazole Pregnancy And Lactation Text: This medication is Pregnancy Category C and it isn't known if it is safe during pregnancy. It is also excreted in breast milk.
Terbinafine Pregnancy And Lactation Text: This medication is Pregnancy Category B and is considered safe during pregnancy. It is also excreted in breast milk and breast feeding isn't recommended.
Fluconazole Counseling:  Patient counseled regarding adverse effects of fluconazole including but not limited to headache, diarrhea, nausea, upset stomach, liver function test abnormalities, taste disturbance, and stomach pain.  There is a rare possibility of liver failure that can occur when taking fluconazole.  The patient understands that monitoring of LFTs and kidney function test may be required, especially at baseline. The patient verbalized understanding of the proper use and possible adverse effects of fluconazole.  All of the patient's questions and concerns were addressed.
Sski Pregnancy And Lactation Text: This medication is Pregnancy Category D and isn't considered safe during pregnancy. It is excreted in breast milk.
Calcipotriene Counseling:  I discussed with the patient the risks of calcipotriene including but not limited to erythema, scaling, itching, and irritation.
Solaraze Counseling:  I discussed with the patient the risks of Solaraze including but not limited to erythema, scaling, itching, weeping, crusting, and pain.
Low Dose Naltrexone Pregnancy And Lactation Text: Naltrexone is pregnancy category C.  There have been no adequate and well-controlled studies in pregnant women.  It should be used in pregnancy only if the potential benefit justifies the potential risk to the fetus.   Limited data indicates that naltrexone is minimally excreted into breastmilk.
Oral Minoxidil Counseling- I discussed with the patient the risks of oral minoxidil including but not limited to shortness of breath, swelling of the feet or ankles, dizziness, lightheadedness, unwanted hair growth and allergic reaction.  The patient verbalized understanding of the proper use and possible adverse effects of oral minoxidil.  All of the patient's questions and concerns were addressed.
Xeljanz Counseling: I discussed with the patient the risks of Xeljanz therapy including increased risk of infection, liver issues, headache, diarrhea, or cold symptoms. Live vaccines should be avoided. They were instructed to call if they have any problems.
Detail Level: Simple
Minocycline Counseling: Patient advised regarding possible photosensitivity and discoloration of the teeth, skin, lips, tongue and gums.  Patient instructed to avoid sunlight, if possible.  When exposed to sunlight, patients should wear protective clothing, sunglasses, and sunscreen.  The patient was instructed to call the office immediately if the following severe adverse effects occur:  hearing changes, easy bruising/bleeding, severe headache, or vision changes.  The patient verbalized understanding of the proper use and possible adverse effects of minocycline.  All of the patient's questions and concerns were addressed.
Erivedge Counseling- I discussed with the patient the risks of Erivedge including but not limited to nausea, vomiting, diarrhea, constipation, weight loss, changes in the sense of taste, decreased appetite, muscle spasms, and hair loss.  The patient verbalized understanding of the proper use and possible adverse effects of Erivedge.  All of the patient's questions and concerns were addressed.
Finasteride Pregnancy And Lactation Text: This medication is absolutely contraindicated during pregnancy. It is unknown if it is excreted in breast milk.
Calcipotriene Pregnancy And Lactation Text: The use of this medication during pregnancy or lactation is not recommended as there is insufficient data.
Opzelura Pregnancy And Lactation Text: There is insufficient data to evaluate drug-associated risk for major birth defects, miscarriage, or other adverse maternal or fetal outcomes.  There is a pregnancy registry that monitors pregnancy outcomes in pregnant persons exposed to the medication during pregnancy.  It is unknown if this medication is excreted in breast milk.  Do not breastfeed during treatment and for about 4 weeks after the last dose.
Cephalexin Pregnancy And Lactation Text: This medication is Pregnancy Category B and considered safe during pregnancy.  It is also excreted in breast milk but can be used safely for shorter doses.
Dupixent Counseling: I discussed with the patient the risks of dupilumab including but not limited to eye infection and irritation, cold sores, injection site reactions, worsening of asthma, allergic reactions and increased risk of parasitic infection.  Live vaccines should be avoided while taking dupilumab. Dupilumab will also interact with certain medications such as warfarin and cyclosporine. The patient understands that monitoring is required and they must alert us or the primary physician if symptoms of infection or other concerning signs are noted.
Acitretin Counseling:  I discussed with the patient the risks of acitretin including but not limited to hair loss, dry lips/skin/eyes, liver damage, hyperlipidemia, depression/suicidal ideation, photosensitivity.  Serious rare side effects can include but are not limited to pancreatitis, pseudotumor cerebri, bony changes, clot formation/stroke/heart attack.  Patient understands that alcohol is contraindicated since it can result in liver toxicity and significantly prolong the elimination of the drug by many years.
Dapsone Counseling: I discussed with the patient the risks of dapsone including but not limited to hemolytic anemia, agranulocytosis, rashes, methemoglobinemia, kidney failure, peripheral neuropathy, headaches, GI upset, and liver toxicity.  Patients who start dapsone require monitoring including baseline LFTs and weekly CBCs for the first month, then every month thereafter.  The patient verbalized understanding of the proper use and possible adverse effects of dapsone.  All of the patient's questions and concerns were addressed.
Cimetidine Counseling:  I discussed with the patient the risks of Cimetidine including but not limited to gynecomastia, headache, diarrhea, nausea, drowsiness, arrhythmias, pancreatitis, skin rashes, psychosis, bone marrow suppression and kidney toxicity.
Aklief counseling:  Patient advised to apply a pea-sized amount only at bedtime and wait 30 minutes after washing their face before applying.  If too drying, patient may add a non-comedogenic moisturizer.  The most commonly reported side effects including irritation, redness, scaling, dryness, stinging, burning, itching, and increased risk of sunburn.  The patient verbalized understanding of the proper use and possible adverse effects of retinoids.  All of the patient's questions and concerns were addressed.
Rituxan Counseling:  I discussed with the patient the risks of Rituxan infusions. Side effects can include infusion reactions, severe drug rashes including mucocutaneous reactions, reactivation of latent hepatitis and other infections and rarely progressive multifocal leukoencephalopathy.  All of the patient's questions and concerns were addressed.
Topical Ketoconazole Counseling: Patient counseled that this medication may cause skin irritation or allergic reactions.  In the event of skin irritation, the patient was advised to reduce the amount of the drug applied or use it less frequently.   The patient verbalized understanding of the proper use and possible adverse effects of ketoconazole.  All of the patient's questions and concerns were addressed.
Hydroquinone Counseling:  Patient advised that medication may result in skin irritation, lightening (hypopigmentation), dryness, and burning.  In the event of skin irritation, the patient was advised to reduce the amount of the drug applied or use it less frequently.  Rarely, spots that are treated with hydroquinone can become darker (pseudoochronosis).  Should this occur, patient instructed to stop medication and call the office. The patient verbalized understanding of the proper use and possible adverse effects of hydroquinone.  All of the patient's questions and concerns were addressed.
Solaraze Pregnancy And Lactation Text: This medication is Pregnancy Category B and is considered safe. There is some data to suggest avoiding during the third trimester. It is unknown if this medication is excreted in breast milk.
Niacinamide Counseling: I recommended taking niacin or niacinamide, also know as vitamin B3, twice daily. Recent evidence suggests that taking vitamin B3 (500 mg twice daily) can reduce the risk of actinic keratoses and non-melanoma skin cancers. Side effects of vitamin B3 include flushing and headache.
Thalidomide Counseling: I discussed with the patient the risks of thalidomide including but not limited to birth defects, anxiety, weakness, chest pain, dizziness, cough and severe allergy.
Griseofulvin Counseling:  I discussed with the patient the risks of griseofulvin including but not limited to photosensitivity, cytopenia, liver damage, nausea/vomiting and severe allergy.  The patient understands that this medication is best absorbed when taken with a fatty meal (e.g., ice cream or french fries).
Cantharidin Counseling:  I discussed with the patient the risks of Cantharidin including but not limited to pain, redness, burning, itching, and blistering.
Ivermectin Counseling:  Patient instructed to take medication on an empty stomach with a full glass of water.  Patient informed of potential adverse effects including but not limited to nausea, diarrhea, dizziness, itching, and swelling of the extremities or lymph nodes.  The patient verbalized understanding of the proper use and possible adverse effects of ivermectin.  All of the patient's questions and concerns were addressed.
Opioid Counseling: I discussed with the patient the potential side effects of opioids including but not limited to addiction, altered mental status, and depression. I stressed avoiding alcohol, benzodiazepines, muscle relaxants and sleep aids unless specifically okayed by a physician. The patient verbalized understanding of the proper use and possible adverse effects of opioids. All of the patient's questions and concerns were addressed. They were instructed to flush the remaining pills down the toilet if they did not need them for pain.
Clindamycin Counseling: I counseled the patient regarding use of clindamycin as an antibiotic for prophylactic and/or therapeutic purposes. Clindamycin is active against numerous classes of bacteria, including skin bacteria. Side effects may include nausea, diarrhea, gastrointestinal upset, rash, hives, yeast infections, and in rare cases, colitis.
Oral Minoxidil Pregnancy And Lactation Text: This medication should only be used when clearly needed if you are pregnant, attempting to become pregnant or breast feeding.
Birth Control Pills Counseling: Birth Control Pill Counseling: I discussed with the patient the potential side effects of OCPs including but not limited to increased risk of stroke, heart attack, thrombophlebitis, deep venous thrombosis, hepatic adenomas, breast changes, GI upset, headaches, and depression.  The patient verbalized understanding of the proper use and possible adverse effects of OCPs. All of the patient's questions and concerns were addressed.
Winlevi Counseling:  I discussed with the patient the risks of topical clascoterone including but not limited to erythema, scaling, itching, and stinging. Patient voiced their understanding.
Xeldemetriaz Pregnancy And Lactation Text: This medication is Pregnancy Category D and is not considered safe during pregnancy.  The risk during breast feeding is also uncertain.
Cibinqo Counseling: I discussed with the patient the risks of Cibinqo therapy including but not limited to common cold, nausea, headache, cold sores, increased blood CPK levels, dizziness, UTIs, fatigue, acne, and vomitting. Live vaccines should be avoided.  This medication has been linked to serious infections; higher rate of mortality; malignancy and lymphoproliferative disorders; major adverse cardiovascular events; thrombosis; thrombocytopenia and lymphopenia; lipid elevations; and retinal detachment.
Picato Counseling:  I discussed with the patient the risks of Picato including but not limited to erythema, scaling, itching, weeping, crusting, and pain.
Dupixent Pregnancy And Lactation Text: This medication likely crosses the placenta but the risk for the fetus is uncertain. This medication is excreted in breast milk.
Dapsone Pregnancy And Lactation Text: This medication is Pregnancy Category C and is not considered safe during pregnancy or breast feeding.
Aklief Pregnancy And Lactation Text: It is unknown if this medication is safe to use during pregnancy.  It is unknown if this medication is excreted in breast milk.  Breastfeeding women should use the topical cream on the smallest area of the skin for the shortest time needed while breastfeeding.  Do not apply to nipple and areola.
Acitretin Pregnancy And Lactation Text: This medication is Pregnancy Category X and should not be given to women who are pregnant or may become pregnant in the future. This medication is excreted in breast milk.
Rituxan Pregnancy And Lactation Text: This medication is Pregnancy Category C and it isn't know if it is safe during pregnancy. It is unknown if this medication is excreted in breast milk but similar antibodies are known to be excreted.
Cantharidin Pregnancy And Lactation Text: This medication has not been proven safe during pregnancy. It is unknown if this medication is excreted in breast milk.
Cyclophosphamide Counseling:  I discussed with the patient the risks of cyclophosphamide including but not limited to hair loss, hormonal abnormalities, decreased fertility, abdominal pain, diarrhea, nausea and vomiting, bone marrow suppression and infection. The patient understands that monitoring is required while taking this medication.
Soolantra Counseling: I discussed with the patients the risks of topial Soolantra. This is a medicine which decreases the number of mites and inflammation in the skin. You experience burning, stinging, eye irritation or allergic reactions.  Please call our office if you develop any problems from using this medication.
Opioid Pregnancy And Lactation Text: These medications can lead to premature delivery and should be avoided during pregnancy. These medications are also present in breast milk in small amounts.
5-Fu Counseling: 5-Fluorouracil Counseling:  I discussed with the patient the risks of 5-fluorouracil including but not limited to erythema, scaling, itching, weeping, crusting, and pain.
Otezla Counseling: The side effects of Otezla were discussed with the patient, including but not limited to worsening or new depression, weight loss, diarrhea, nausea, upper respiratory tract infection, and headache. Patient instructed to call the office should any adverse effect occur.  The patient verbalized understanding of the proper use and possible adverse effects of Otezla.  All the patient's questions and concerns were addressed.
Quinolones Counseling:  I discussed with the patient the risks of fluoroquinolones including but not limited to GI upset, allergic reaction, drug rash, diarrhea, dizziness, photosensitivity, yeast infections, liver function test abnormalities, tendonitis/tendon rupture.
Libtayo Counseling- I discussed with the patient the risks of Libtayo including but not limited to nausea, vomiting, diarrhea, and bone or muscle pain.  The patient verbalized understanding of the proper use and possible adverse effects of Libtayo.  All of the patient's questions and concerns were addressed.
Birth Control Pills Pregnancy And Lactation Text: This medication should be avoided if pregnant and for the first 30 days post-partum.
Gabapentin Counseling: I discussed with the patient the risks of gabapentin including but not limited to dizziness, somnolence, fatigue and ataxia.
Griseofulvin Pregnancy And Lactation Text: This medication is Pregnancy Category X and is known to cause serious birth defects. It is unknown if this medication is excreted in breast milk but breast feeding should be avoided.
Cibinqo Pregnancy And Lactation Text: It is unknown if this medication will adversely affect pregnancy or breast feeding.  You should not take this medication if you are currently pregnant or planning a pregnancy or while breastfeeding.
Winlevi Pregnancy And Lactation Text: This medication is considered safe during pregnancy and breastfeeding.
Clindamycin Pregnancy And Lactation Text: This medication can be used in pregnancy if certain situations. Clindamycin is also present in breast milk.
Enbrel Counseling:  I discussed with the patient the risks of etanercept including but not limited to myelosuppression, immunosuppression, autoimmune hepatitis, demyelinating diseases, lymphoma, and infections.  The patient understands that monitoring is required including a PPD at baseline and must alert us or the primary physician if symptoms of infection or other concerning signs are noted.
Azelaic Acid Counseling: Patient counseled that medicine may cause skin irritation and to avoid applying near the eyes.  In the event of skin irritation, the patient was advised to reduce the amount of the drug applied or use it less frequently.   The patient verbalized understanding of the proper use and possible adverse effects of azelaic acid.  All of the patient's questions and concerns were addressed.
Bexarotene Counseling:  I discussed with the patient the risks of bexarotene including but not limited to hair loss, dry lips/skin/eyes, liver abnormalities, hyperlipidemia, pancreatitis, depression/suicidal ideation, photosensitivity, drug rash/allergic reactions, hypothyroidism, anemia, leukopenia, infection, cataracts, and teratogenicity.  Patient understands that they will need regular blood tests to check lipid profile, liver function tests, white blood cell count, thyroid function tests and pregnancy test if applicable.
Taltz Counseling: I discussed with the patient the risks of ixekizumab including but not limited to immunosuppression, serious infections, worsening of inflammatory bowel disease and drug reactions.  The patient understands that monitoring is required including a PPD at baseline and must alert us or the primary physician if symptoms of infection or other concerning signs are noted.
Topical Metronidazole Counseling: Metronidazole is a topical antibiotic medication. You may experience burning, stinging, redness, or allergic reactions.  Please call our office if you develop any problems from using this medication.
Imiquimod Counseling:  I discussed with the patient the risks of imiquimod including but not limited to erythema, scaling, itching, weeping, crusting, and pain.  Patient understands that the inflammatory response to imiquimod is variable from person to person and was educated regarded proper titration schedule.  If flu-like symptoms develop, patient knows to discontinue the medication and contact us.
Cyclophosphamide Pregnancy And Lactation Text: This medication is Pregnancy Category D and it isn't considered safe during pregnancy. This medication is excreted in breast milk.
Siliq Counseling:  I discussed with the patient the risks of Siliq including but not limited to new or worsening depression, suicidal thoughts and behavior, immunosuppression, malignancy, posterior leukoencephalopathy syndrome, and serious infections.  The patient understands that monitoring is required including a PPD at baseline and must alert us or the primary physician if symptoms of infection or other concerning signs are noted. There is also a special program designed to monitor depression which is required with Siliq.
Tranexamic Acid Counseling:  Patient advised of the small risk of bleeding problems with tranexamic acid. They were also instructed to call if they developed any nausea, vomiting or diarrhea. All of the patient's questions and concerns were addressed.
Doxepin Counseling:  Patient advised that the medication is sedating and not to drive a car after taking this medication. Patient informed of potential adverse effects including but not limited to dry mouth, urinary retention, and blurry vision.  The patient verbalized understanding of the proper use and possible adverse effects of doxepin.  All of the patient's questions and concerns were addressed.
Soolantra Pregnancy And Lactation Text: This medication is Pregnancy Category C. This medication is considered safe during breast feeding.
Azithromycin Counseling:  I discussed with the patient the risks of azithromycin including but not limited to GI upset, allergic reaction, drug rash, diarrhea, and yeast infections.
Rifampin Counseling: I discussed with the patient the risks of rifampin including but not limited to liver damage, kidney damage, red-orange body fluids, nausea/vomiting and severe allergy.
Odomzo Counseling- I discussed with the patient the risks of Odomzo including but not limited to nausea, vomiting, diarrhea, constipation, weight loss, changes in the sense of taste, decreased appetite, muscle spasms, and hair loss.  The patient verbalized understanding of the proper use and possible adverse effects of Odomzo.  All of the patient's questions and concerns were addressed.
Olumiant Counseling: I discussed with the patient the risks of Olumiant therapy including but not limited to upper respiratory tract infections, shingles, cold sores, and nausea. Live vaccines should be avoided.  This medication has been linked to serious infections; higher rate of mortality; malignancy and lymphoproliferative disorders; major adverse cardiovascular events; thrombosis; gastrointestinal perforations; neutropenia; lymphopenia; anemia; liver enzyme elevations; and lipid elevations.
Protopic Counseling: Patient may experience a mild burning sensation during topical application. Protopic is not approved in children less than 2 years of age. There have been case reports of hematologic and skin malignancies in patients using topical calcineurin inhibitors although causality is questionable.
Otezla Pregnancy And Lactation Text: This medication is Pregnancy Category C and it isn't known if it is safe during pregnancy. It is unknown if it is excreted in breast milk.
Doxycycline Pregnancy And Lactation Text: This medication is Pregnancy Category D and not consider safe during pregnancy. It is also excreted in breast milk but is considered safe for shorter treatment courses.
Humira Counseling:  I discussed with the patient the risks of adalimumab including but not limited to myelosuppression, immunosuppression, autoimmune hepatitis, demyelinating diseases, lymphoma, and serious infections.  The patient understands that monitoring is required including a PPD at baseline and must alert us or the primary physician if symptoms of infection or other concerning signs are noted.
Doxycycline Counseling:  Patient counseled regarding possible photosensitivity and increased risk for sunburn.  Patient instructed to avoid sunlight, if possible.  When exposed to sunlight, patients should wear protective clothing, sunglasses, and sunscreen.  The patient was instructed to call the office immediately if the following severe adverse effects occur:  hearing changes, easy bruising/bleeding, severe headache, or vision changes.  The patient verbalized understanding of the proper use and possible adverse effects of doxycycline.  All of the patient's questions and concerns were addressed.
Libtayo Pregnancy And Lactation Text: This medication is contraindicated in pregnancy and when breast feeding.
Spironolactone Counseling: Patient advised regarding risks of diarrhea, abdominal pain, hyperkalemia, birth defects (for female patients), liver toxicity and renal toxicity. The patient may need blood work to monitor liver and kidney function and potassium levels while on therapy. The patient verbalized understanding of the proper use and possible adverse effects of spironolactone.  All of the patient's questions and concerns were addressed.
VTAMA Counseling: I discussed with the patient that VTAMA is not for use in the eyes, mouth or mouth. They should call the office if they develop any signs of allergic reactions to VTAMA. The patient verbalized understanding of the proper use and possible adverse effects of VTAMA.  All of the patient's questions and concerns were addressed.
Topical Metronidazole Pregnancy And Lactation Text: This medication is Pregnancy Category B and considered safe during pregnancy.  It is also considered safe to use while breastfeeding.
Klisyri Pregnancy And Lactation Text: It is unknown if this medication can harm a developing fetus or if it is excreted in breast milk.
Cyclosporine Counseling:  I discussed with the patient the risks of cyclosporine including but not limited to hypertension, gingival hyperplasia,myelosuppression, immunosuppression, liver damage, kidney damage, neurotoxicity, lymphoma, and serious infections. The patient understands that monitoring is required including baseline blood pressure, CBC, CMP, lipid panel and uric acid, and then 1-2 times monthly CMP and blood pressure.
Adbry Counseling: I discussed with the patient the risks of tralokinumab including but not limited to eye infection and irritation, cold sores, injection site reactions, worsening of asthma, allergic reactions and increased risk of parasitic infection.  Live vaccines should be avoided while taking tralokinumab. The patient understands that monitoring is required and they must alert us or the primary physician if symptoms of infection or other concerning signs are noted.
Arava Counseling:  Patient counseled regarding adverse effects of Arava including but not limited to nausea, vomiting, abnormalities in liver function tests. Patients may develop mouth sores, rash, diarrhea, and abnormalities in blood counts. The patient understands that monitoring is required including LFTs and blood counts.  There is a rare possibility of scarring of the liver and lung problems that can occur when taking methotrexate. Persistent nausea, loss of appetite, pale stools, dark urine, cough, and shortness of breath should be reported immediately. Patient advised to discontinue Arava treatment and consult with a physician prior to attempting conception. The patient will have to undergo a treatment to eliminate Arava from the body prior to conception.
Itraconazole Counseling:  I discussed with the patient the risks of itraconazole including but not limited to liver damage, nausea/vomiting, neuropathy, and severe allergy.  The patient understands that this medication is best absorbed when taken with acidic beverages such as non-diet cola or ginger ale.  The patient understands that monitoring is required including baseline LFTs and repeat LFTs at intervals.  The patient understands that they are to contact us or the primary physician if concerning signs are noted.
Bexarotene Pregnancy And Lactation Text: This medication is Pregnancy Category X and should not be given to women who are pregnant or may become pregnant. This medication should not be used if you are breast feeding.

## 2023-10-05 ENCOUNTER — APPOINTMENT (RX ONLY)
Dept: URBAN - METROPOLITAN AREA CLINIC 4 | Facility: CLINIC | Age: 28
Setting detail: DERMATOLOGY
End: 2023-10-05

## 2023-10-05 DIAGNOSIS — L259 CONTACT DERMATITIS AND OTHER ECZEMA, UNSPECIFIED CAUSE: ICD-10-CM

## 2023-10-05 PROBLEM — L23.9 ALLERGIC CONTACT DERMATITIS, UNSPECIFIED CAUSE: Status: ACTIVE | Noted: 2023-10-05

## 2023-10-05 PROCEDURE — ? MEDICATION COUNSELING

## 2023-10-05 PROCEDURE — ? ADDITIONAL NOTES

## 2023-10-05 PROCEDURE — 99213 OFFICE O/P EST LOW 20 MIN: CPT

## 2023-10-05 PROCEDURE — ? PRESCRIPTION

## 2023-10-05 PROCEDURE — ? COUNSELING

## 2023-10-05 RX ORDER — TRIAMCINOLONE ACETONIDE 1 MG/G
1 OINTMENT TOPICAL BID
Qty: 160 | Refills: 0 | Status: CANCELLED

## 2023-10-05 RX ORDER — CEPHALEXIN 500 MG/1
1 CAPSULE ORAL TID
Qty: 21 | Refills: 0 | Status: ERX | COMMUNITY
Start: 2023-10-05

## 2023-10-05 RX ORDER — PREDNISONE 10 MG/1
TABLET ORAL
Qty: 42 | Refills: 0 | Status: ERX | COMMUNITY
Start: 2023-10-05

## 2023-10-05 RX ORDER — TRIAMCINOLONE ACETONIDE 1 MG/G
CREAM TOPICAL BID
Qty: 240 | Refills: 0 | Status: ERX | COMMUNITY
Start: 2023-10-05

## 2023-10-05 RX ADMIN — PREDNISONE: 10 TABLET ORAL at 00:00

## 2023-10-05 RX ADMIN — CEPHALEXIN 1: 500 CAPSULE ORAL at 00:00

## 2023-10-05 RX ADMIN — TRIAMCINOLONE ACETONIDE: 1 CREAM TOPICAL at 00:00

## 2023-10-05 ASSESSMENT — LOCATION ZONE DERM
LOCATION ZONE: TRUNK
LOCATION ZONE: LEG

## 2023-10-05 ASSESSMENT — LOCATION SIMPLE DESCRIPTION DERM
LOCATION SIMPLE: RIGHT THIGH
LOCATION SIMPLE: RIGHT LOWER BACK
LOCATION SIMPLE: LEFT THIGH
LOCATION SIMPLE: RIGHT UPPER BACK
LOCATION SIMPLE: ABDOMEN

## 2023-10-05 ASSESSMENT — LOCATION DETAILED DESCRIPTION DERM
LOCATION DETAILED: EPIGASTRIC SKIN
LOCATION DETAILED: RIGHT ANTERIOR PROXIMAL THIGH
LOCATION DETAILED: RIGHT INFERIOR MEDIAL LOWER BACK
LOCATION DETAILED: LEFT ANTERIOR PROXIMAL THIGH
LOCATION DETAILED: RIGHT MEDIAL UPPER BACK

## 2023-10-05 NOTE — PROCEDURE: ADDITIONAL NOTES
Detail Level: Simple
Additional Notes: Provided prednisone. Patient will let us know how she is doing in 4 days. Patent will contact us when she completes the prednisone cycle. Discussed patch testing
Render Risk Assessment In Note?: no

## 2023-11-06 RX ORDER — TRIAMCINOLONE ACETONIDE 1 MG/G
CREAM TOPICAL BID
Qty: 240 | Refills: 0 | Status: ERX

## 2023-11-09 RX ORDER — TRIAMCINOLONE ACETONIDE 1 MG/G
CREAM TOPICAL BID
Qty: 240 | Refills: 0 | Status: CANCELLED

## 2023-11-28 ENCOUNTER — APPOINTMENT (RX ONLY)
Dept: URBAN - METROPOLITAN AREA CLINIC 4 | Facility: CLINIC | Age: 28
Setting detail: DERMATOLOGY
End: 2023-11-28

## 2023-11-28 DIAGNOSIS — L259 CONTACT DERMATITIS AND OTHER ECZEMA, UNSPECIFIED CAUSE: ICD-10-CM

## 2023-11-28 PROBLEM — L30.9 DERMATITIS, UNSPECIFIED: Status: ACTIVE | Noted: 2023-11-28

## 2023-11-28 PROCEDURE — ? PATCH TESTING

## 2023-11-28 PROCEDURE — 95044 PATCH/APPLICATION TESTS: CPT

## 2023-11-28 NOTE — PROCEDURE: PATCH TESTING
Post-Care Instructions: I reviewed with the patient in detail post-care instructions. Patient should not sweat, pick at, or get the patches wet for 48 hours.
Number Of Patches (Maximum Allowable Per Dos By Cms Is 90): 88
Consent: Verbal consent obtained, risks reviewed including but not limited to rash, itching, allergic reaction, systemic rash, remote possibility of anaphylaxis to allergen.
Detail Level: Zone

## 2023-11-30 ENCOUNTER — APPOINTMENT (RX ONLY)
Dept: URBAN - METROPOLITAN AREA CLINIC 4 | Facility: CLINIC | Age: 28
Setting detail: DERMATOLOGY
End: 2023-11-30

## 2023-11-30 DIAGNOSIS — L259 CONTACT DERMATITIS AND OTHER ECZEMA, UNSPECIFIED CAUSE: ICD-10-CM | Status: INADEQUATELY CONTROLLED

## 2023-11-30 PROBLEM — L30.9 DERMATITIS, UNSPECIFIED: Status: ACTIVE | Noted: 2023-11-30

## 2023-11-30 PROCEDURE — ? CORE ACDS PATCH TEST READING

## 2023-11-30 PROCEDURE — ? PRESCRIPTION

## 2023-11-30 PROCEDURE — 99214 OFFICE O/P EST MOD 30 MIN: CPT

## 2023-11-30 RX ORDER — TRIAMCINOLONE ACETONIDE 1 MG/G
1 CREAM TOPICAL BID
Qty: 454 | Refills: 0 | Status: ERX | COMMUNITY
Start: 2023-11-30

## 2023-11-30 RX ADMIN — TRIAMCINOLONE ACETONIDE 1: 1 CREAM TOPICAL at 00:00

## 2023-11-30 NOTE — PROCEDURE: CORE ACDS PATCH TEST READING
Name Of Allergen 48: cinnamic aldehyde
Name Of Allergen 47: lavandula angustifolia oil
Name Of Allergen 26: benzocaine
Name Of Allergen 17: methylchloroisothiazolinone/methylisothiazolinone
Allergen 89 Reaction: no reaction
Name Of Allergen 32: 2-mercaptobenzothiazole
Name Of Allergen 29: imidazolidinyl urea
Name Of Allergen 18: quaternium 15
Name Of Allergen 36: lidocaine-hci
Name Of Allergen 88: nora
Number Of Patches Read: 88
Name Of Allergen 21: formaldehyde
Name Of Allergen 20: petrolatum-phenylenediamine
Name Of Allergen 41: mixed dialkyl thioureas
Name Of Allergen 19: lyral
Name Of Allergen 2: amerchol L101
Name Of Allergen 57: sesquiterpenelactone mix
Name Of Allergen 84: peppermint oil
Name Of Allergen 14: bisphenol A epoxy resin
Name Of Allergen 22: mercapto mix A
Show Allergen Counseling In The Note?: Yes
Name Of Allergen 24: thiuram mix A
Name Of Allergen 4: potassium dichromate
Name Of Allergen 35: disperse blue mix 124/106
Name Of Allergen 6: fragrance mix
Name Of Allergen 13: petrolatum-tert- butylphenol formaldehyde resin
Name Of Allergen 67: sorbitan sesquioleate
Name Of Allergen 86: lauryl glycoside
Name Of Allergen 66: dimethylol dihydroxyethyleneurea
Name Of Allergen 39: polymyxin B sulfate
Name Of Allergen 25: diazolidinyl urea
Name Of Allergen 52: chlorhexidine diclugonate
Name Of Allergen 37: propylene glycol
Name Of Allergen 1: nickel sulfate hexahydrate
Name Of Allergen 50: ethyl acrylate
Name Of Allergen 82: disperse yellow
Name Of Allergen 56: tosylamide formaldehyde resin
Name Of Allergen 69: cetylstearyalcohol
Name Of Allergen 68: 1,3-diphenylguanidine
Name Of Allergen 34: fragrance mix 2
Name Of Allergen 79: 2-ethylhexyl-4-methoxycinnamate
Name Of Allergen 77: benzoic acid
Name Of Allergen 64: cananga odorata
Name Of Allergen 72: clobetasol-17-propionate
Name Of Allergen 33: bacitracin
Name Of Allergen 70: ethyl hexyl glycerol
Name Of Allergen 83: jasminum (Hebrew rashad)
Name Of Allergen 73: amidoamine
Name Of Allergen 12: cobalt 2 chloride hexahydrate
Name Of Allergen 80: benzyl alcohol
Name Of Allergen 9: methylisothiazolinone
Name Of Allergen 71: triamcinalone acetonide
Name Of Allergen 30: budesonide
Name Of Allergen 74: ethyl cyanoacrylate
Name Of Allergen 65: compositae mix
Name Of Allergen 15: carba mix
Detail Level: Zone
Name Of Allergen 63: sorbic acid
Name Of Allergen 16: black rubber mix
Name Of Allergen 45: decyl glucoside
Name Of Allergen 87: 4-chloro-3-cresol
Name Of Allergen 43: 2-hydroxyethyl-methacrylate
Name Of Allergen 46: methyl methacrylate
Name Of Allergen 61: benzophenone chloride
What Reading Time Point?: 48 hour
Name Of Allergen 8: paraben mix B
Name Of Allergen 44: oleamidopropyl dimethylamine
Name Of Allergen 23: 2-bromo-2-nitropropane-1,3-diol
Name Of Allergen 58: cocunut diethanolamide
Name Of Allergen 28: gold sodium thiosulfate
Name Of Allergen 27: tixocortol-21-pivalate
Name Of Allergen 10: balsam of peru
Name Of Allergen 53: propolis
Name Of Allergen 60: benzalkonium chloride
Name Of Allergen 85: shellac
Name Of Allergen 62: sodium benzoate
Name Of Allergen 38: iodopropyynyl betaine
Name Of Allergen 5: DMDM hydantoin
Name Of Allergen 49: dl alpha tocopherol
Name Of Allergen 78: butylhydroxytoluene
Name Of Allergen 54: 4-chloro-3,5-xylenol
Name Of Allergen 42: dimethylaminopropylamine
Name Of Allergen 31: hydrocortisone-17-butyrate
Name Of Allergen 7: colophony
Name Of Allergen 76: disperse orange-3
Name Of Allergen 75: phenoxyethanol
Name Of Allergen 55: 2-hydroxy-4-methoxybenzophenone
Name Of Allergen 11: ethylenediamine dihydrochloride
Name Of Allergen 81: benzyl salicylate
Name Of Allergen 3: neomycin sulfate
Name Of Allergen 51: tea tree oil, oxidized
Name Of Allergen 40: cocamidopropyl betaine

## 2023-12-05 ENCOUNTER — APPOINTMENT (RX ONLY)
Dept: URBAN - METROPOLITAN AREA CLINIC 4 | Facility: CLINIC | Age: 28
Setting detail: DERMATOLOGY
End: 2023-12-05

## 2023-12-05 DIAGNOSIS — L20.89 OTHER ATOPIC DERMATITIS: ICD-10-CM | Status: INADEQUATELY CONTROLLED

## 2023-12-05 DIAGNOSIS — L259 CONTACT DERMATITIS AND OTHER ECZEMA, UNSPECIFIED CAUSE: ICD-10-CM

## 2023-12-05 PROBLEM — L30.9 DERMATITIS, UNSPECIFIED: Status: ACTIVE | Noted: 2023-12-05

## 2023-12-05 PROCEDURE — ? COUNSELING

## 2023-12-05 PROCEDURE — ? PRESCRIPTION MEDICATION MANAGEMENT

## 2023-12-05 PROCEDURE — ? ADDITIONAL NOTES

## 2023-12-05 PROCEDURE — ? DIAGNOSIS COMMENT

## 2023-12-05 PROCEDURE — 99214 OFFICE O/P EST MOD 30 MIN: CPT

## 2023-12-05 PROCEDURE — ? ORDER TESTS

## 2023-12-05 PROCEDURE — ? CORE ACDS PATCH TEST READING

## 2023-12-05 ASSESSMENT — LOCATION SIMPLE DESCRIPTION DERM
LOCATION SIMPLE: RIGHT UPPER BACK
LOCATION SIMPLE: RIGHT PRETIBIAL REGION
LOCATION SIMPLE: RIGHT ELBOW
LOCATION SIMPLE: LEFT PRETIBIAL REGION
LOCATION SIMPLE: LEFT FOREARM

## 2023-12-05 ASSESSMENT — LOCATION DETAILED DESCRIPTION DERM
LOCATION DETAILED: RIGHT PROXIMAL PRETIBIAL REGION
LOCATION DETAILED: LEFT PROXIMAL DORSAL FOREARM
LOCATION DETAILED: RIGHT MEDIAL UPPER BACK
LOCATION DETAILED: RIGHT ELBOW
LOCATION DETAILED: LEFT PROXIMAL PRETIBIAL REGION

## 2023-12-05 ASSESSMENT — LOCATION ZONE DERM
LOCATION ZONE: TRUNK
LOCATION ZONE: ARM
LOCATION ZONE: LEG

## 2023-12-05 ASSESSMENT — ITCH NUMERIC RATING SCALE: HOW SEVERE IS YOUR ITCHING?: 7

## 2023-12-05 ASSESSMENT — SEVERITY ASSESSMENT 2020: SEVERITY 2020: SEVERE

## 2023-12-05 NOTE — PROCEDURE: PRESCRIPTION MEDICATION MANAGEMENT
Initiate Treatment: Triamcinolone cream: apply bid to affected areas all over body 2 weeks
Detail Level: Zone
Render In Strict Bullet Format?: No

## 2023-12-05 NOTE — PROCEDURE: ADDITIONAL NOTES
Render Risk Assessment In Note?: no
Additional Notes: We completed patch testing on this patient who has had a dermatitis involving the body throughout. We found positive reactions to nickel sulfate hexahydrate. Allergens Showing 1+ Reactions: nickel sulfate hexahydrate.\\nWe have reviewed the pertinent allergen data sheets with the patient, and provided copies for future reference.  Allergen data sheets have also been saved in the attachments section of the medical record.\\nPatient will follow up with the referring dermatologist in 2 months.
Detail Level: Simple

## 2023-12-05 NOTE — PROCEDURE: CORE ACDS PATCH TEST READING
Allergen 25 Reaction: no reaction
Name Of Allergen 4: potassium dichromate
Name Of Allergen 56: tosylamide formaldehyde resin
Name Of Allergen 54: 4-chloro-3,5-xylenol
Name Of Allergen 35: disperse blue mix 124/106
Name Of Allergen 61: benzophenone chloride
Name Of Allergen 1: nickel sulfate hexahydrate
Name Of Allergen 66: dimethylol dihydroxyethyleneurea
Name Of Allergen 75: phenoxyethanol
Name Of Allergen 19: lyral
Name Of Allergen 15: carba mix
Name Of Allergen 55: 2-hydroxy-4-methoxybenzophenone
Name Of Allergen 52: chlorhexidine diclugonate
Name Of Allergen 31: hydrocortisone-17-butyrate
Name Of Allergen 29: imidazolidinyl urea
Name Of Allergen 88: nora
Name Of Allergen 45: decyl glucoside
Name Of Allergen 63: sorbic acid
Name Of Allergen 16: black rubber mix
Name Of Allergen 33: bacitracin
Name Of Allergen 7: colophony
Name Of Allergen 36: lidocaine-hci
Name Of Allergen 21: formaldehyde
Name Of Allergen 77: benzoic acid
Name Of Allergen 53: propolis
Name Of Allergen 13: petrolatum-tert- butylphenol formaldehyde resin
Name Of Allergen 5: DMDM hydantoin
What Reading Time Point?: 168 hour
Name Of Allergen 40: cocamidopropyl betaine
Name Of Allergen 30: budesonide
Name Of Allergen 2: amerchol L101
Name Of Allergen 47: lavandula angustifolia oil
Name Of Allergen 20: petrolatum-phenylenediamine
Name Of Allergen 38: iodopropyynyl betaine
Name Of Allergen 51: tea tree oil, oxidized
Name Of Allergen 57: sesquiterpenelactone mix
Name Of Allergen 58: cocunut diethanolamide
Name Of Allergen 87: 4-chloro-3-cresol
Detail Level: Zone
Name Of Allergen 83: jasminum (Amharic rashad)
Name Of Allergen 22: mercapto mix A
Name Of Allergen 28: gold sodium thiosulfate
Number Of Patches Read: 88
Name Of Allergen 44: oleamidopropyl dimethylamine
Name Of Allergen 41: mixed dialkyl thioureas
Show Negative Results In The Note?: Yes
Name Of Allergen 32: 2-mercaptobenzothiazole
Name Of Allergen 18: quaternium 15
Name Of Allergen 64: cananga odorata
Name Of Allergen 27: tixocortol-21-pivalate
Name Of Allergen 71: triamcinalone acetonide
Name Of Allergen 86: lauryl glycoside
Name Of Allergen 11: ethylenediamine dihydrochloride
Name Of Allergen 39: polymyxin B sulfate
Name Of Allergen 24: thiuram mix A
Name Of Allergen 82: disperse yellow
Name Of Allergen 8: paraben mix B
Name Of Allergen 9: methylisothiazolinone
Name Of Allergen 14: bisphenol A epoxy resin
Name Of Allergen 85: shellac
Name Of Allergen 3: neomycin sulfate
Name Of Allergen 67: sorbitan sesquioleate
Name Of Allergen 42: dimethylaminopropylamine
Name Of Allergen 65: compositae mix
Name Of Allergen 48: cinnamic aldehyde
Name Of Allergen 76: disperse orange-3
Name Of Allergen 81: benzyl saliculate
Allergen 1 Reaction: 1+
Name Of Allergen 43: 2-hydroxyethyl-methacrylate
Name Of Allergen 69: cetylstearyalcohol
Name Of Allergen 23: 2-bromo-2-nitropropane-1,3-diol
Name Of Allergen 70: ethyl hexyl glycerol
Name Of Allergen 78: butylhydroxytoluene
Name Of Allergen 46: methyl methacrylate
Name Of Allergen 12: cobalt 2 chloride hexahydrate
Name Of Allergen 80: benzyl alcohol
Name Of Allergen 37: propylene glycol
Name Of Allergen 68: 1,3-diphenylguanidine
Name Of Allergen 73: amidoamine
Name Of Allergen 72: clobetasol-17-propionate
Name Of Allergen 49: dl alpha tocopherol
Name Of Allergen 25: diazolidinyl urea
Name Of Allergen 62: sodium benzoate
Name Of Allergen 17: methylchloroisothiazolinone/methylisothiazolinone
Name Of Allergen 6: fragrance mix
Name Of Allergen 26: benzocaine
Name Of Allergen 84: peppermint oil
Name Of Allergen 10: balsam of peru
Name Of Allergen 79: 2-ethylhexyl-4-methoxycinnamate
Name Of Allergen 74: ethyl cyanoacrylate
Name Of Allergen 60: benzalkonium chloride
Name Of Allergen 34: fragrance mix 2
Name Of Allergen 50: ethyl acrylate

## 2023-12-05 NOTE — PROCEDURE: DIAGNOSIS COMMENT
Comment: 12/5/23 Do not favor contact allergy as culprit to this extensive of a rash. Patient will draw labs to initiate Dupixent vs Rinvoq. She is to call once results are in for a follow up discussion and initiation. Both medications discussed in detail. Of note, she is not breast feeding and does have a hx of high lipids while she was pregnant.
Render Risk Assessment In Note?: no
Detail Level: Simple

## 2023-12-22 ENCOUNTER — RX ONLY (OUTPATIENT)
Age: 28
Setting detail: RX ONLY
End: 2023-12-22

## 2023-12-22 RX ORDER — DUPILUMAB 300 MG/2ML
1 INJECTION, SOLUTION SUBCUTANEOUS
Qty: 4 | Refills: 11 | Status: ERX | COMMUNITY
Start: 2023-12-22

## 2023-12-22 RX ORDER — DUPILUMAB 300 MG/2ML
INJECTION, SOLUTION SUBCUTANEOUS
Qty: 6 | Refills: 0 | Status: ERX

## 2024-02-02 ENCOUNTER — RX ONLY (OUTPATIENT)
Age: 29
Setting detail: RX ONLY
End: 2024-02-02

## 2024-02-02 RX ORDER — TRIAMCINOLONE ACETONIDE 1 MG/G
OINTMENT TOPICAL
Qty: 454 | Refills: 0 | Status: ERX

## 2024-02-02 RX ORDER — CLOBETASOL PROPIONATE 0.5 MG/ML
SOLUTION TOPICAL
Qty: 50 | Refills: 0 | Status: ERX | COMMUNITY
Start: 2024-02-02

## 2024-02-03 ENCOUNTER — APPOINTMENT (RX ONLY)
Dept: URBAN - METROPOLITAN AREA CLINIC 4 | Facility: CLINIC | Age: 29
Setting detail: DERMATOLOGY
End: 2024-02-03

## 2024-02-03 DIAGNOSIS — L20.89 OTHER ATOPIC DERMATITIS: ICD-10-CM

## 2024-02-03 PROCEDURE — ? DUPIXENT INJECTION

## 2024-02-03 PROCEDURE — 96372 THER/PROPH/DIAG INJ SC/IM: CPT

## 2024-02-03 ASSESSMENT — LOCATION DETAILED DESCRIPTION DERM
LOCATION DETAILED: LEFT ANTERIOR PROXIMAL THIGH
LOCATION DETAILED: RIGHT ANTERIOR PROXIMAL THIGH

## 2024-02-03 ASSESSMENT — LOCATION SIMPLE DESCRIPTION DERM
LOCATION SIMPLE: LEFT THIGH
LOCATION SIMPLE: RIGHT THIGH

## 2024-02-03 ASSESSMENT — LOCATION ZONE DERM: LOCATION ZONE: LEG

## 2024-02-03 NOTE — PROCEDURE: DUPIXENT INJECTION
Dupixent Dosing: 600 mg
Treatment Number (Optional): 1
Detail Level: None
Syringe Size Used (Required For Enhanced Ndc): 300 mg/2ml prefilled pen
Was The Medication Purchased By The Clinic?: No
Consent: The risks of pain and injection site reactions were reviewed with the patient prior to the injection.
Ndc (200 Mg Prefilled Syringe): [address]-17
Ndc (300 Mg Prefilled Syringe): [address]-12
Administered By (Optional): Kelby ASKEW
Use Enhanced Ndc?: Yes
Ndc (200 Mg Prefilled Pen): 4393-5840-38
J-Code: 
Ndc (300 Mg Prefilled Pen): [address]-91

## 2024-04-04 ENCOUNTER — RX ONLY (OUTPATIENT)
Age: 29
Setting detail: RX ONLY
End: 2024-04-04

## 2024-04-04 RX ORDER — TRIAMCINOLONE ACETONIDE 1 MG/G
OINTMENT TOPICAL
Qty: 454 | Refills: 0 | Status: ERX

## 2024-06-24 ENCOUNTER — OFFICE VISIT (OUTPATIENT)
Dept: URGENT CARE | Facility: PHYSICIAN GROUP | Age: 29
End: 2024-06-24
Payer: OTHER GOVERNMENT

## 2024-06-24 VITALS
DIASTOLIC BLOOD PRESSURE: 72 MMHG | HEIGHT: 60 IN | BODY MASS INDEX: 24.54 KG/M2 | SYSTOLIC BLOOD PRESSURE: 122 MMHG | WEIGHT: 125 LBS | RESPIRATION RATE: 14 BRPM | HEART RATE: 111 BPM | OXYGEN SATURATION: 95 % | TEMPERATURE: 99.1 F

## 2024-06-24 DIAGNOSIS — H10.13 ALLERGIC CONJUNCTIVITIS OF BOTH EYES: ICD-10-CM

## 2024-06-24 PROCEDURE — 3078F DIAST BP <80 MM HG: CPT | Performed by: REGISTERED NURSE

## 2024-06-24 PROCEDURE — 3074F SYST BP LT 130 MM HG: CPT | Performed by: REGISTERED NURSE

## 2024-06-24 PROCEDURE — 99214 OFFICE O/P EST MOD 30 MIN: CPT | Performed by: REGISTERED NURSE

## 2024-06-24 RX ORDER — OLOPATADINE HYDROCHLORIDE 1 MG/ML
1 SOLUTION/ DROPS OPHTHALMIC 2 TIMES DAILY
Qty: 5 ML | Refills: 0 | Status: SHIPPED | OUTPATIENT
Start: 2024-06-24

## 2024-06-24 RX ORDER — DUPILUMAB 300 MG/2ML
INJECTION, SOLUTION SUBCUTANEOUS
COMMUNITY
Start: 2024-06-19

## 2024-06-24 ASSESSMENT — VISUAL ACUITY: OU: 1

## 2024-06-24 ASSESSMENT — ENCOUNTER SYMPTOMS
EYE PAIN: 0
ABDOMINAL PAIN: 0
SHORTNESS OF BREATH: 0
DIZZINESS: 0
FEVER: 0
SORE THROAT: 0
CHILLS: 0

## 2024-06-24 NOTE — PROGRESS NOTES
Subjective:   Glenn Melvin is a 29 y.o. female who presents for Allergic Reaction (X2 days Allergic reaction while in [place], B eye swelling, redness and itching )      HPI  2-3 days of bilateral eye redness and itching. Has tried a dose of claritin and benadryl which does seem to help. Was [place] in the [place] at a concert when symptoms started. Does not wear contacts.     Denies eye pain, photophobia, foreign body sensation, bright flashing lights, acute vision changes, fever, neck pain    Review of Systems   Constitutional:  Negative for chills and fever.   HENT:  Negative for ear pain and sore throat.    Eyes:  Negative for pain.   Respiratory:  Negative for shortness of breath.    Cardiovascular:  Negative for chest pain.   Gastrointestinal:  Negative for abdominal pain.   Skin:  Negative for rash.   Neurological:  Negative for dizziness.     History reviewed. No pertinent past medical history.    Current Outpatient Medications Ordered in Epic   Medication Sig Dispense Refill    DUPIXENT 300 MG/2ML injection       olopatadine (PATANOL) 0.1 % ophthalmic solution Administer 1 Drop into both eyes 2 times a day. For allergic conjunctivitis 5 mL 0    BLISOVI FE 1/20 1-20 MG-MCG per tablet Take 1 Tablet by mouth every day. (Patient not taking: Reported on 6/24/2024)       No current [facility]-ordered facility-administered medications on file.       No past surgical history on file.    Social History     Tobacco Use    Smoking status: Never    Smokeless tobacco: Never   Vaping Use    Vaping status: Never Used   Substance Use Topics    Alcohol use: Never    Drug use: Never       family history is not on file.        Objective:     /72   Pulse (!) 111   Temp 37.3 °C (99.1 °F) (Temporal)   Resp 14   Ht 1.524 m (5')   Wt 56.7 kg (125 lb)   SpO2 95%     Physical Exam  Vitals and nursing note reviewed.   Constitutional:       Appearance: Normal appearance. She is not ill-appearing or toxic-appearing.   HENT:      Right  Ear: Tympanic membrane, ear canal and external ear normal.      Left Ear: Tympanic membrane, ear canal and external ear normal.      Nose: Nose normal. No congestion or rhinorrhea.      Mouth/Throat:      Mouth: Mucous membranes are moist.   Eyes:      General: Lids are normal. Vision grossly intact.      Conjunctiva/sclera:      Right eye: Right conjunctiva is injected. No chemosis, exudate or hemorrhage.     Left eye: Left conjunctiva is injected. No chemosis, exudate or hemorrhage.     Pupils: Pupils are equal, round, and reactive to light.   Cardiovascular:      Rate and Rhythm: Regular rhythm. Tachycardia present.   Pulmonary:      Effort: Pulmonary effort is normal. No respiratory distress.      Breath sounds: Normal breath sounds.   Musculoskeletal:         General: Normal range of motion.      Cervical back: Normal range of motion.   Skin:     General: Skin is warm and dry.      Capillary Refill: Capillary refill takes less than 2 seconds.      Findings: No rash.   Neurological:      General: No focal deficit present.      Mental Status: She is alert and oriented to person, place, and time.      Cranial Nerves: No cranial nerve deficit.   Psychiatric:         Mood and Affect: Mood normal.         Assessment/Plan:     I personally reviewed prior external notes and test results pertinent to today's visit as well as additional imaging and testing completed in clinic today. Shared decision-making was utilized with patient for treatment plan.     1. Allergic conjunctivitis of both eyes  olopatadine (PATANOL) 0.1 % ophthalmic solution        Patient was at a concert up in [place] [facility] when she developed some bilateral eye redness and itching does respond to Claritin and Benadryl.  No red flag signs or symptoms.  Does not wear contacts.  Does have some tachycardia which raise my suspicion for systemic involvement but unable to identify source of bacterial infection.  Does have bilateral conjunctival injection no exudate,  no other abnormalities identified.  Will place on allergy eyedrops.  Discussed continuing with Claritin and Benadryl as needed.  Avoid triggers.  Reviewed signs symptoms that require immediate attention    Medication discussed included indication for use and the potential benefits and side effects. Education was provided regarding the aforementioned assessments. All of the patient's questions were answered to their satisfaction at the time of discharge. Patient was encouraged to monitor symptoms closely, and we reviewed the signs and symptoms which would warrant concern and mandate seeking a higher level of service through the emergency department. Patient stated agreement and understanding of this plan of care.     Please note that this dictation was created using voice recognition software. I have made every reasonable attempt to correct obvious errors, but I expect that there are errors of grammar and possibly content that I did not discover before finalizing the note.    This note was electronically signed by ROSA Landon